# Patient Record
Sex: FEMALE | Race: WHITE | Employment: OTHER | URBAN - METROPOLITAN AREA
[De-identification: names, ages, dates, MRNs, and addresses within clinical notes are randomized per-mention and may not be internally consistent; named-entity substitution may affect disease eponyms.]

---

## 2024-04-18 ENCOUNTER — APPOINTMENT (OUTPATIENT)
Dept: RADIOLOGY | Facility: HOSPITAL | Age: 79
End: 2024-04-18
Payer: COMMERCIAL

## 2024-04-18 ENCOUNTER — HOSPITAL ENCOUNTER (OUTPATIENT)
Facility: HOSPITAL | Age: 79
Setting detail: OBSERVATION
Discharge: HOME | End: 2024-04-20
Attending: EMERGENCY MEDICINE | Admitting: STUDENT IN AN ORGANIZED HEALTH CARE EDUCATION/TRAINING PROGRAM
Payer: COMMERCIAL

## 2024-04-18 DIAGNOSIS — R29.90 STROKE-LIKE SYMPTOMS: Primary | ICD-10-CM

## 2024-04-18 DIAGNOSIS — I48.0 PAROXYSMAL ATRIAL FIBRILLATION (MULTI): ICD-10-CM

## 2024-04-18 DIAGNOSIS — I50.32 HEART FAILURE WITH IMPROVED EJECTION FRACTION (HFIMPEF) (MULTI): ICD-10-CM

## 2024-04-18 DIAGNOSIS — R56.9 FOCAL SEIZURE (MULTI): ICD-10-CM

## 2024-04-18 LAB
ALBUMIN SERPL BCP-MCNC: 4.1 G/DL (ref 3.4–5)
ALP SERPL-CCNC: 88 U/L (ref 33–136)
ALT SERPL W P-5'-P-CCNC: 19 U/L (ref 7–45)
ANION GAP SERPL CALC-SCNC: 23 MMOL/L (ref 10–20)
APTT PPP: 32 SECONDS (ref 27–38)
AST SERPL W P-5'-P-CCNC: 30 U/L (ref 9–39)
BASOPHILS # BLD AUTO: 0.06 X10*3/UL (ref 0–0.1)
BASOPHILS NFR BLD AUTO: 0.4 %
BILIRUB SERPL-MCNC: 0.7 MG/DL (ref 0–1.2)
BUN SERPL-MCNC: 20 MG/DL (ref 6–23)
CALCIUM SERPL-MCNC: 9.5 MG/DL (ref 8.6–10.3)
CARDIAC TROPONIN I PNL SERPL HS: 13 NG/L (ref 0–13)
CHLORIDE SERPL-SCNC: 102 MMOL/L (ref 98–107)
CO2 SERPL-SCNC: 18 MMOL/L (ref 21–32)
CREAT SERPL-MCNC: 1.1 MG/DL (ref 0.5–1.05)
EGFRCR SERPLBLD CKD-EPI 2021: 52 ML/MIN/1.73M*2
EOSINOPHIL # BLD AUTO: 0.04 X10*3/UL (ref 0–0.4)
EOSINOPHIL NFR BLD AUTO: 0.3 %
ERYTHROCYTE [DISTWIDTH] IN BLOOD BY AUTOMATED COUNT: 13.1 % (ref 11.5–14.5)
GLUCOSE SERPL-MCNC: 132 MG/DL (ref 74–99)
HCT VFR BLD AUTO: 47.1 % (ref 36–46)
HGB BLD-MCNC: 16.1 G/DL (ref 12–16)
HOLD SPECIMEN: NORMAL
IMM GRANULOCYTES # BLD AUTO: 0.09 X10*3/UL (ref 0–0.5)
IMM GRANULOCYTES NFR BLD AUTO: 0.6 % (ref 0–0.9)
INR PPP: 1 (ref 0.9–1.1)
LYMPHOCYTES # BLD AUTO: 2.6 X10*3/UL (ref 0.8–3)
LYMPHOCYTES NFR BLD AUTO: 18.6 %
MCH RBC QN AUTO: 31 PG (ref 26–34)
MCHC RBC AUTO-ENTMCNC: 34.2 G/DL (ref 32–36)
MCV RBC AUTO: 91 FL (ref 80–100)
MONOCYTES # BLD AUTO: 1.14 X10*3/UL (ref 0.05–0.8)
MONOCYTES NFR BLD AUTO: 8.2 %
NEUTROPHILS # BLD AUTO: 10.05 X10*3/UL (ref 1.6–5.5)
NEUTROPHILS NFR BLD AUTO: 71.9 %
NRBC BLD-RTO: 0 /100 WBCS (ref 0–0)
PLATELET # BLD AUTO: 194 X10*3/UL (ref 150–450)
POTASSIUM SERPL-SCNC: 4.3 MMOL/L (ref 3.5–5.3)
PROT SERPL-MCNC: 7.3 G/DL (ref 6.4–8.2)
PROTHROMBIN TIME: 11.3 SECONDS (ref 9.8–12.8)
RBC # BLD AUTO: 5.2 X10*6/UL (ref 4–5.2)
SODIUM SERPL-SCNC: 139 MMOL/L (ref 136–145)
WBC # BLD AUTO: 14 X10*3/UL (ref 4.4–11.3)

## 2024-04-18 PROCEDURE — 84484 ASSAY OF TROPONIN QUANT: CPT | Performed by: EMERGENCY MEDICINE

## 2024-04-18 PROCEDURE — 84075 ASSAY ALKALINE PHOSPHATASE: CPT | Performed by: EMERGENCY MEDICINE

## 2024-04-18 PROCEDURE — 70498 CT ANGIOGRAPHY NECK: CPT | Performed by: RADIOLOGY

## 2024-04-18 PROCEDURE — 70496 CT ANGIOGRAPHY HEAD: CPT | Performed by: RADIOLOGY

## 2024-04-18 PROCEDURE — 93005 ELECTROCARDIOGRAM TRACING: CPT

## 2024-04-18 PROCEDURE — 2550000001 HC RX 255 CONTRASTS: Performed by: EMERGENCY MEDICINE

## 2024-04-18 PROCEDURE — G0378 HOSPITAL OBSERVATION PER HR: HCPCS

## 2024-04-18 PROCEDURE — 85025 COMPLETE CBC W/AUTO DIFF WBC: CPT | Performed by: EMERGENCY MEDICINE

## 2024-04-18 PROCEDURE — 70450 CT HEAD/BRAIN W/O DYE: CPT | Mod: 59

## 2024-04-18 PROCEDURE — 99291 CRITICAL CARE FIRST HOUR: CPT | Mod: 25 | Performed by: EMERGENCY MEDICINE

## 2024-04-18 PROCEDURE — 2500000004 HC RX 250 GENERAL PHARMACY W/ HCPCS (ALT 636 FOR OP/ED): Performed by: EMERGENCY MEDICINE

## 2024-04-18 PROCEDURE — 70450 CT HEAD/BRAIN W/O DYE: CPT | Performed by: RADIOLOGY

## 2024-04-18 PROCEDURE — 70498 CT ANGIOGRAPHY NECK: CPT

## 2024-04-18 PROCEDURE — 36415 COLL VENOUS BLD VENIPUNCTURE: CPT | Performed by: EMERGENCY MEDICINE

## 2024-04-18 PROCEDURE — 96361 HYDRATE IV INFUSION ADD-ON: CPT

## 2024-04-18 PROCEDURE — 96375 TX/PRO/DX INJ NEW DRUG ADDON: CPT | Mod: 59

## 2024-04-18 PROCEDURE — 85730 THROMBOPLASTIN TIME PARTIAL: CPT | Performed by: EMERGENCY MEDICINE

## 2024-04-18 PROCEDURE — 85610 PROTHROMBIN TIME: CPT | Performed by: EMERGENCY MEDICINE

## 2024-04-18 PROCEDURE — 82465 ASSAY BLD/SERUM CHOLESTEROL: CPT | Mod: 91

## 2024-04-18 RX ORDER — LEVETIRACETAM 15 MG/ML
1500 INJECTION INTRAVASCULAR ONCE
Status: COMPLETED | OUTPATIENT
Start: 2024-04-18 | End: 2024-04-18

## 2024-04-18 RX ORDER — NADOLOL 20 MG/1
20 TABLET ORAL DAILY
COMMUNITY

## 2024-04-18 RX ORDER — SODIUM CHLORIDE, SODIUM LACTATE, POTASSIUM CHLORIDE, CALCIUM CHLORIDE 600; 310; 30; 20 MG/100ML; MG/100ML; MG/100ML; MG/100ML
100 INJECTION, SOLUTION INTRAVENOUS CONTINUOUS
Status: DISCONTINUED | OUTPATIENT
Start: 2024-04-19 | End: 2024-04-19

## 2024-04-18 RX ORDER — LORAZEPAM 2 MG/ML
1 INJECTION INTRAMUSCULAR ONCE
Status: COMPLETED | OUTPATIENT
Start: 2024-04-18 | End: 2024-04-18

## 2024-04-18 RX ORDER — TALC
3 POWDER (GRAM) TOPICAL NIGHTLY PRN
Status: DISCONTINUED | OUTPATIENT
Start: 2024-04-18 | End: 2024-04-20 | Stop reason: HOSPADM

## 2024-04-18 RX ORDER — ACETAMINOPHEN 160 MG/5ML
650 SOLUTION ORAL EVERY 4 HOURS PRN
Status: DISCONTINUED | OUTPATIENT
Start: 2024-04-18 | End: 2024-04-20 | Stop reason: HOSPADM

## 2024-04-18 RX ORDER — DILTIAZEM HYDROCHLORIDE 5 MG/ML
15 INJECTION INTRAVENOUS ONCE
Status: DISCONTINUED | OUTPATIENT
Start: 2024-04-18 | End: 2024-04-18

## 2024-04-18 RX ORDER — LEVETIRACETAM 500 MG/1
500 TABLET ORAL 2 TIMES DAILY
Status: DISCONTINUED | OUTPATIENT
Start: 2024-04-18 | End: 2024-04-18

## 2024-04-18 RX ORDER — ACETAMINOPHEN 650 MG/1
650 SUPPOSITORY RECTAL EVERY 4 HOURS PRN
Status: DISCONTINUED | OUTPATIENT
Start: 2024-04-18 | End: 2024-04-20 | Stop reason: HOSPADM

## 2024-04-18 RX ORDER — LEVETIRACETAM 500 MG/1
500 TABLET ORAL 2 TIMES DAILY
Status: DISCONTINUED | OUTPATIENT
Start: 2024-04-19 | End: 2024-04-20 | Stop reason: HOSPADM

## 2024-04-18 RX ORDER — NADOLOL 40 MG/1
20 TABLET ORAL DAILY
Status: DISCONTINUED | OUTPATIENT
Start: 2024-04-19 | End: 2024-04-20 | Stop reason: HOSPADM

## 2024-04-18 RX ORDER — POLYETHYLENE GLYCOL 3350 17 G/17G
17 POWDER, FOR SOLUTION ORAL DAILY
Status: DISCONTINUED | OUTPATIENT
Start: 2024-04-19 | End: 2024-04-20 | Stop reason: HOSPADM

## 2024-04-18 RX ORDER — ACETAMINOPHEN 325 MG/1
650 TABLET ORAL EVERY 4 HOURS PRN
Status: DISCONTINUED | OUTPATIENT
Start: 2024-04-18 | End: 2024-04-20 | Stop reason: HOSPADM

## 2024-04-18 RX ADMIN — IOHEXOL 75 ML: 350 INJECTION, SOLUTION INTRAVENOUS at 19:42

## 2024-04-18 RX ADMIN — LORAZEPAM 1 MG: 2 INJECTION, SOLUTION INTRAMUSCULAR; INTRAVENOUS at 19:25

## 2024-04-18 RX ADMIN — LORAZEPAM 1 MG: 2 INJECTION, SOLUTION INTRAMUSCULAR; INTRAVENOUS at 19:15

## 2024-04-18 RX ADMIN — LEVETIRACETAM 1500 MG: 15 INJECTION INTRAVENOUS at 19:49

## 2024-04-18 RX ADMIN — SODIUM CHLORIDE, POTASSIUM CHLORIDE, SODIUM LACTATE AND CALCIUM CHLORIDE 1000 ML: 600; 310; 30; 20 INJECTION, SOLUTION INTRAVENOUS at 20:17

## 2024-04-18 ASSESSMENT — LIFESTYLE VARIABLES
TOTAL SCORE: 0
EVER FELT BAD OR GUILTY ABOUT YOUR DRINKING: NO
HAVE YOU EVER FELT YOU SHOULD CUT DOWN ON YOUR DRINKING: NO
EVER HAD A DRINK FIRST THING IN THE MORNING TO STEADY YOUR NERVES TO GET RID OF A HANGOVER: NO
HAVE PEOPLE ANNOYED YOU BY CRITICIZING YOUR DRINKING: NO

## 2024-04-18 ASSESSMENT — PAIN SCALES - GENERAL
PAINLEVEL_OUTOF10: 0 - NO PAIN

## 2024-04-18 ASSESSMENT — COGNITIVE AND FUNCTIONAL STATUS - GENERAL
PERSONAL GROOMING: A LOT
MOBILITY SCORE: 9
CLIMB 3 TO 5 STEPS WITH RAILING: TOTAL
WALKING IN HOSPITAL ROOM: TOTAL
TURNING FROM BACK TO SIDE WHILE IN FLAT BAD: A LOT
DAILY ACTIVITIY SCORE: 12
DRESSING REGULAR UPPER BODY CLOTHING: A LOT
MOVING FROM LYING ON BACK TO SITTING ON SIDE OF FLAT BED WITH BEDRAILS: A LOT
DRESSING REGULAR LOWER BODY CLOTHING: A LOT
EATING MEALS: A LOT
MOVING TO AND FROM BED TO CHAIR: A LOT
STANDING UP FROM CHAIR USING ARMS: TOTAL
HELP NEEDED FOR BATHING: A LOT
TOILETING: A LOT

## 2024-04-18 ASSESSMENT — PAIN - FUNCTIONAL ASSESSMENT
PAIN_FUNCTIONAL_ASSESSMENT: 0-10
PAIN_FUNCTIONAL_ASSESSMENT: 0-10

## 2024-04-18 NOTE — ED PROVIDER NOTES
History/Exam limitations: none.   Additional history was obtained from patient and EMS personnel.    HPI:       Tamar Connelly is a 78 y.o. with a history of prior stroke, liver disease, history of DVT, who presents to the emergency department with change in mental status.  Patient was last seen normal at 8 AM today.  Her daughter left the house and went to work.  When she got home, she found her on the ground.  The patient had left-sided weakness, difficulty with gaze, and repeated tremors of the left side.  She cannot oppose gravity with left side.  She states that happened today but is unsure of what time it started.  In review of the records, patient does have prior history of stroke.  At one point, she was on anticoagulation.       Last Known Well Time: 0800        ------------------------------------------------------------------------------------------------------------------------------------------     Physical Exam:    ED Triage Vitals   Temp Pulse Resp BP   -- -- -- --      SpO2 Temp src Heart Rate Source Patient Position   -- -- -- --      BP Location FiO2 (%)     -- --          Gen: Not in acute distress  Head/Neck: NCAT  Eyes: Anicteric sclerae, noninjected conjunctivae  Nose: No rhinorrhea  Mouth:  MMM  Heart: Regular rate and rhythm w/ no murmurs, rubs, gallops  Lungs: CTAB w/o wheezes, rales, rhonchi, no increased work of breathing  Abdomen: Soft, NT/ND  Musculoskeletal: No deformities  Extremities: No edema.  Neurologic: Please see below for NIHSS  Skin: No rashes noted  Psychological: Calm        Interval: Baseline  Time: 9:03 PM  Person Administering Scale: Tee Krause MD     1a  Level of consciousness: 0=alert; keenly responsive   1b. LOC questions:  0=Performs both tasks correctly   1c. LOC commands: 0=Performs both tasks correctly   2.  Best Gaze: 1=partial gaze palsy   3. Visual: 0=No visual loss   4. Facial Palsy: 1=Minor paralysis (flattened nasolabial fold, asymmetric on smiling)   5a.  Motor left arm: 3=No effort against gravity, limb falls   5b.  Motor right arm: 0=No drift, limb holds 90 (or 45) degrees for full 10 seconds   6a. motor left leg: 3=No effort against gravity, limb falls   6b  Motor right le=No drift, limb holds 90 (or 45) degrees for full 10 seconds   7. Limb Ataxia: 1=Present in one limb   8.  Sensory: 1=Mild to moderate sensory loss; patient feels pinprick is less sharp or is dull on the affected side; there is a loss of superficial pain with pinprick but patient is aware She is being touched   9. Best Language:  0=No aphasia, normal   10. Dysarthria: 1=Mild to moderate, patient slurs at least some words and at worst, can be understood with some difficulty   11. Extinction and Inattention: 0=No abnormality     Total:   11         VAN: VAN: Positive     ------------------------------------------------------------------------------------------------------------------------------------------     Medical Decision Making:     ED Course as of 24   Thu  Mild leukocytosis 14,000.  Coags unremarkable. [MK]    Metabolic panel does show mild elevation in the anion gap.  I do feel this is consistent with the patient partial seizures.  Blood sugar is unremarkable. [MK]    CT head shows no acute intracranial hemorrhage.  There is large encephalomalacia in the right hemisphere consistent with prior stroke. [MK]    CTA shows no evidence of large vessel cutoff. [MK]      ED Course User Index  [MK] Tee Krause MD         Diagnoses as of 24   Stroke-like symptoms   Focal seizure (Multi)     MDM: Patient presents with left-sided weakness.  However, on arrival she is having focal seizure.  There is no secondary generalization but there is rhythmic motion of the left upper extremity.  Patient was given 1 mg of Ativan with some improvement but no abatement.  Second milligram was given and patient had resolution.  She does have some  improved motion of the upper extremity.  Stroke team had been activated and the patient was sent for CT and CTA.  She is not a TNK candidate as she is fully anticoagulated on Xarelto and last known well was approximate 11 hours prior.  Patient was also loaded with Keppra.  Patient has had no further seizure activity.  She does have A-fib with borderline RVR.  She was given fluids which seemed to improve it.  At this point, patient will be admitted for further observation and workup.  Family is agreeable with plan of care.    EKG interpreted by myself: Atrial fibrillation with rapid ventricular response.  Pre-existing right bundle branch block.  Rate of 156.  Mild lateral ST depression, rate based.  No STEMI.     Independent Interpretation of Studies: I independently interpreted the CT head and see No obvious evidence of intracranial hemorrhage    Chronic Medical Conditions Significantly Affecting Care: A-fib, prior stroke    Social Determinants of Health Significantly Affecting Care:  Does not follow-up consistently with healthcare     External Records Reviewed: I reviewed recent and relevant outside records including: Prior stroke, history of anticoagulant use and is on Xarelto     Discussion of Management with Other Providers:   I discussed the patient/results with: neurology and the admitting team      IV Thrombolysis IV Thrombolysis Checklist        IV Thrombolysis Given: No; Thrombolysis contraindication reason: Time from Last Known Well (or stroke onset) is >4.5 hours  and Patient receiving direct thrombin inhibitors or direct Factor Xa inhibitors          Procedure  Critical Care    Performed by: Tee Krause MD  Authorized by: Tee Krause MD    Critical care provider statement:     Critical care time (minutes):  35    Critical care time was exclusive of:  Separately billable procedures and treating other patients and teaching time    Critical care was necessary to treat or prevent imminent or  life-threatening deterioration of the following conditions:  CNS failure or compromise    Critical care was time spent personally by me on the following activities:  Ordering and performing treatments and interventions, ordering and review of laboratory studies, ordering and review of radiographic studies, re-evaluation of patient's condition, review of old charts, examination of patient, discussions with primary provider, discussions with consultants and evaluation of patient's response to treatment    Care discussed with: admitting provider             Tee Krause MD  04/18/24 5489

## 2024-04-18 NOTE — PROGRESS NOTES
PHARMACY STROKE RESPONSE      Patient Name: Tamar Connelly  MRN: [id number]  Location: [place] [address]    Patient Weight (kg):   Wt Readings from Last 1 Encounters:   04/18/24 76.5 kg (168 lb 10.4 oz)        An acute Brain Attack has been activated, pharmacy participated in multidisciplinary team bedside response for Tamar Connelly.  Contraindications for fibrinolytic therapy have been reviewed by pharmacy and any issues relating to medication therapy have been discussed directly with the provider(s) caring for this patient.     Pharmacy aided in the procurement, preparation, bedside response for antiepileptic medications(s). Patient did not receive fibrinolysis.    Orders Placed This Encounter      levETIRAcetam (Keppra) 1,500 mg in NaCl (iso-os) 100 mL      LORazepam (Ativan) injection 1 mg      LORazepam (Ativan) injection 1 mg      Thank you for allowing me to take part in the care of this patient.     Digna Carmen, PharmD  4/18/2024  7:55 PM         References:    Neu[facility] Stroke Tools   [facility] IV Thrombolysis Checklist

## 2024-04-19 ENCOUNTER — APPOINTMENT (OUTPATIENT)
Dept: CARDIOLOGY | Facility: HOSPITAL | Age: 79
End: 2024-04-19
Payer: COMMERCIAL

## 2024-04-19 ENCOUNTER — APPOINTMENT (OUTPATIENT)
Dept: NEUROLOGY | Facility: HOSPITAL | Age: 79
End: 2024-04-19
Payer: COMMERCIAL

## 2024-04-19 ENCOUNTER — APPOINTMENT (OUTPATIENT)
Dept: RADIOLOGY | Facility: HOSPITAL | Age: 79
End: 2024-04-19
Payer: COMMERCIAL

## 2024-04-19 LAB
ALBUMIN SERPL BCP-MCNC: 3.2 G/DL (ref 3.4–5)
ANION GAP SERPL CALC-SCNC: 11 MMOL/L (ref 10–20)
AORTIC VALVE MEAN GRADIENT: 2 MMHG
AORTIC VALVE PEAK VELOCITY: 0.81 M/S
AV PEAK GRADIENT: 2.6 MMHG
AVA (PEAK VEL): 4.18 CM2
AVA (VTI): 3.73 CM2
BUN SERPL-MCNC: 19 MG/DL (ref 6–23)
CALCIUM SERPL-MCNC: 8.9 MG/DL (ref 8.6–10.3)
CHLORIDE SERPL-SCNC: 104 MMOL/L (ref 98–107)
CHOLEST SERPL-MCNC: 240 MG/DL (ref 0–199)
CHOLESTEROL/HDL RATIO: 3.5
CO2 SERPL-SCNC: 27 MMOL/L (ref 21–32)
CREAT SERPL-MCNC: 0.95 MG/DL (ref 0.5–1.05)
EGFRCR SERPLBLD CKD-EPI 2021: 62 ML/MIN/1.73M*2
EJECTION FRACTION APICAL 4 CHAMBER: 50
ERYTHROCYTE [DISTWIDTH] IN BLOOD BY AUTOMATED COUNT: 13.2 % (ref 11.5–14.5)
EST. AVERAGE GLUCOSE BLD GHB EST-MCNC: 108 MG/DL
GLUCOSE SERPL-MCNC: 99 MG/DL (ref 74–99)
HBA1C MFR BLD: 5.4 %
HCT VFR BLD AUTO: 38.5 % (ref 36–46)
HDLC SERPL-MCNC: 69.4 MG/DL
HGB BLD-MCNC: 12.4 G/DL (ref 12–16)
LACTATE SERPL-SCNC: 1 MMOL/L (ref 0.4–2)
LEFT VENTRICLE INTERNAL DIMENSION DIASTOLE: 3.7 CM (ref 3.5–6)
LEFT VENTRICULAR OUTFLOW TRACT DIAMETER: 2 CM
LV EJECTION FRACTION BIPLANE: 49 %
MCH RBC QN AUTO: 30 PG (ref 26–34)
MCHC RBC AUTO-ENTMCNC: 32.2 G/DL (ref 32–36)
MCV RBC AUTO: 93 FL (ref 80–100)
NON-HDL CHOLESTEROL: 171 MG/DL (ref 0–149)
NRBC BLD-RTO: 0 /100 WBCS (ref 0–0)
PHOSPHATE SERPL-MCNC: 3.6 MG/DL (ref 2.5–4.9)
PLATELET # BLD AUTO: 115 X10*3/UL (ref 150–450)
POTASSIUM SERPL-SCNC: 4.3 MMOL/L (ref 3.5–5.3)
RBC # BLD AUTO: 4.14 X10*6/UL (ref 4–5.2)
RIGHT VENTRICLE FREE WALL PEAK S': 6.53 CM/S
RIGHT VENTRICLE PEAK SYSTOLIC PRESSURE: 25.3 MMHG
SODIUM SERPL-SCNC: 138 MMOL/L (ref 136–145)
TRICUSPID ANNULAR PLANE SYSTOLIC EXCURSION: 1.2 CM
WBC # BLD AUTO: 7.6 X10*3/UL (ref 4.4–11.3)

## 2024-04-19 PROCEDURE — G0378 HOSPITAL OBSERVATION PER HR: HCPCS

## 2024-04-19 PROCEDURE — 2500000004 HC RX 250 GENERAL PHARMACY W/ HCPCS (ALT 636 FOR OP/ED)

## 2024-04-19 PROCEDURE — 80069 RENAL FUNCTION PANEL: CPT

## 2024-04-19 PROCEDURE — 70551 MRI BRAIN STEM W/O DYE: CPT

## 2024-04-19 PROCEDURE — 85027 COMPLETE CBC AUTOMATED: CPT

## 2024-04-19 PROCEDURE — 2500000001 HC RX 250 WO HCPCS SELF ADMINISTERED DRUGS (ALT 637 FOR MEDICARE OP)

## 2024-04-19 PROCEDURE — 95819 EEG AWAKE AND ASLEEP: CPT | Performed by: PSYCHIATRY & NEUROLOGY

## 2024-04-19 PROCEDURE — 96361 HYDRATE IV INFUSION ADD-ON: CPT

## 2024-04-19 PROCEDURE — 97161 PT EVAL LOW COMPLEX 20 MIN: CPT | Mod: GP

## 2024-04-19 PROCEDURE — 70551 MRI BRAIN STEM W/O DYE: CPT | Performed by: RADIOLOGY

## 2024-04-19 PROCEDURE — 95819 EEG AWAKE AND ASLEEP: CPT

## 2024-04-19 PROCEDURE — 36415 COLL VENOUS BLD VENIPUNCTURE: CPT | Performed by: STUDENT IN AN ORGANIZED HEALTH CARE EDUCATION/TRAINING PROGRAM

## 2024-04-19 PROCEDURE — 2500000006 HC RX 250 W HCPCS SELF ADMINISTERED DRUGS (ALT 637 FOR ALL PAYERS): Mod: MUE

## 2024-04-19 PROCEDURE — 93306 TTE W/DOPPLER COMPLETE: CPT

## 2024-04-19 PROCEDURE — 83036 HEMOGLOBIN GLYCOSYLATED A1C: CPT

## 2024-04-19 PROCEDURE — 99223 1ST HOSP IP/OBS HIGH 75: CPT | Performed by: PSYCHIATRY & NEUROLOGY

## 2024-04-19 PROCEDURE — 93306 TTE W/DOPPLER COMPLETE: CPT | Performed by: INTERNAL MEDICINE

## 2024-04-19 PROCEDURE — 2500000006 HC RX 250 W HCPCS SELF ADMINISTERED DRUGS (ALT 637 FOR ALL PAYERS)

## 2024-04-19 PROCEDURE — 2500000004 HC RX 250 GENERAL PHARMACY W/ HCPCS (ALT 636 FOR OP/ED): Performed by: STUDENT IN AN ORGANIZED HEALTH CARE EDUCATION/TRAINING PROGRAM

## 2024-04-19 PROCEDURE — 83605 ASSAY OF LACTIC ACID: CPT | Performed by: STUDENT IN AN ORGANIZED HEALTH CARE EDUCATION/TRAINING PROGRAM

## 2024-04-19 PROCEDURE — 97165 OT EVAL LOW COMPLEX 30 MIN: CPT | Mod: GO

## 2024-04-19 RX ORDER — ATORVASTATIN CALCIUM 40 MG/1
40 TABLET, FILM COATED ORAL NIGHTLY
Status: DISCONTINUED | OUTPATIENT
Start: 2024-04-19 | End: 2024-04-20 | Stop reason: HOSPADM

## 2024-04-19 RX ADMIN — POLYETHYLENE GLYCOL 3350 17 G: 17 POWDER, FOR SOLUTION ORAL at 08:51

## 2024-04-19 RX ADMIN — SODIUM CHLORIDE, POTASSIUM CHLORIDE, SODIUM LACTATE AND CALCIUM CHLORIDE 100 ML/HR: 600; 310; 30; 20 INJECTION, SOLUTION INTRAVENOUS at 00:40

## 2024-04-19 RX ADMIN — NADOLOL 20 MG: 40 TABLET ORAL at 10:12

## 2024-04-19 RX ADMIN — LEVETIRACETAM 500 MG: 500 TABLET, FILM COATED ORAL at 08:50

## 2024-04-19 RX ADMIN — RIVAROXABAN 20 MG: 20 TABLET, FILM COATED ORAL at 17:19

## 2024-04-19 RX ADMIN — ATORVASTATIN CALCIUM 40 MG: 40 TABLET, FILM COATED ORAL at 20:32

## 2024-04-19 RX ADMIN — LEVETIRACETAM 500 MG: 500 TABLET, FILM COATED ORAL at 20:31

## 2024-04-19 SDOH — ECONOMIC STABILITY: INCOME INSECURITY: IN THE LAST 12 MONTHS, WAS THERE A TIME WHEN YOU WERE NOT ABLE TO PAY THE MORTGAGE OR RENT ON TIME?: NO

## 2024-04-19 SDOH — SOCIAL STABILITY: SOCIAL INSECURITY: ARE YOU OR HAVE YOU BEEN THREATENED OR ABUSED PHYSICALLY, EMOTIONALLY, OR SEXUALLY BY ANYONE?: NO

## 2024-04-19 SDOH — SOCIAL STABILITY: SOCIAL INSECURITY: HAVE YOU HAD ANY THOUGHTS OF HARMING ANYONE ELSE?: NO

## 2024-04-19 SDOH — SOCIAL STABILITY: SOCIAL INSECURITY: HAVE YOU HAD THOUGHTS OF HARMING ANYONE ELSE?: NO

## 2024-04-19 SDOH — ECONOMIC STABILITY: HOUSING INSECURITY: IN THE LAST 12 MONTHS, HOW MANY PLACES HAVE YOU LIVED?: 1

## 2024-04-19 SDOH — ECONOMIC STABILITY: INCOME INSECURITY: HOW HARD IS IT FOR YOU TO PAY FOR THE VERY BASICS LIKE FOOD, HOUSING, MEDICAL CARE, AND HEATING?: NOT VERY HARD

## 2024-04-19 SDOH — ECONOMIC STABILITY: TRANSPORTATION INSECURITY
IN THE PAST 12 MONTHS, HAS LACK OF TRANSPORTATION KEPT YOU FROM MEETINGS, WORK, OR FROM GETTING THINGS NEEDED FOR DAILY LIVING?: NO

## 2024-04-19 SDOH — ECONOMIC STABILITY: TRANSPORTATION INSECURITY
IN THE PAST 12 MONTHS, HAS THE LACK OF TRANSPORTATION KEPT YOU FROM MEDICAL APPOINTMENTS OR FROM GETTING MEDICATIONS?: NO

## 2024-04-19 SDOH — ECONOMIC STABILITY: HOUSING INSECURITY
IN THE LAST 12 MONTHS, WAS THERE A TIME WHEN YOU DID NOT HAVE A STEADY PLACE TO SLEEP OR SLEPT IN A SHELTER (INCLUDING NOW)?: NO

## 2024-04-19 SDOH — SOCIAL STABILITY: SOCIAL INSECURITY: ABUSE: ADULT

## 2024-04-19 SDOH — SOCIAL STABILITY: SOCIAL INSECURITY: WERE YOU ABLE TO COMPLETE ALL THE BEHAVIORAL HEALTH SCREENINGS?: YES

## 2024-04-19 SDOH — SOCIAL STABILITY: SOCIAL INSECURITY: HAS ANYONE EVER THREATENED TO HURT YOUR FAMILY OR YOUR PETS?: NO

## 2024-04-19 SDOH — SOCIAL STABILITY: SOCIAL INSECURITY: DOES ANYONE TRY TO KEEP YOU FROM HAVING/CONTACTING OTHER FRIENDS OR DOING THINGS OUTSIDE YOUR HOME?: NO

## 2024-04-19 SDOH — SOCIAL STABILITY: SOCIAL INSECURITY: DO YOU FEEL ANYONE HAS EXPLOITED OR TAKEN ADVANTAGE OF YOU FINANCIALLY OR OF YOUR PERSONAL PROPERTY?: NO

## 2024-04-19 SDOH — SOCIAL STABILITY: SOCIAL INSECURITY: DO YOU FEEL UNSAFE GOING BACK TO THE PLACE WHERE YOU ARE LIVING?: NO

## 2024-04-19 SDOH — SOCIAL STABILITY: SOCIAL INSECURITY: ARE THERE ANY APPARENT SIGNS OF INJURIES/BEHAVIORS THAT COULD BE RELATED TO ABUSE/NEGLECT?: NO

## 2024-04-19 ASSESSMENT — ACTIVITIES OF DAILY LIVING (ADL)
ADEQUATE_TO_COMPLETE_ADL: YES
LACK_OF_TRANSPORTATION: NO
LACK_OF_TRANSPORTATION: NO
DRESSING YOURSELF: NEEDS ASSISTANCE
WALKS IN HOME: NEEDS ASSISTANCE
GROOMING: NEEDS ASSISTANCE
HEARING - RIGHT EAR: FUNCTIONAL
FEEDING YOURSELF: NEEDS ASSISTANCE
TOILETING: NEEDS ASSISTANCE
PATIENT'S MEMORY ADEQUATE TO SAFELY COMPLETE DAILY ACTIVITIES?: YES
JUDGMENT_ADEQUATE_SAFELY_COMPLETE_DAILY_ACTIVITIES: YES
HEARING - LEFT EAR: FUNCTIONAL
ASSISTIVE_DEVICE: DENTURES UPPER;CANE
BATHING: NEEDS ASSISTANCE

## 2024-04-19 ASSESSMENT — LIFESTYLE VARIABLES
PRESCIPTION_ABUSE_PAST_12_MONTHS: NO
HOW MANY STANDARD DRINKS CONTAINING ALCOHOL DO YOU HAVE ON A TYPICAL DAY: PATIENT DOES NOT DRINK
SKIP TO QUESTIONS 9-10: 1
HOW OFTEN DO YOU HAVE A DRINK CONTAINING ALCOHOL: NEVER
SUBSTANCE_ABUSE_PAST_12_MONTHS: NO
AUDIT-C TOTAL SCORE: 0
HOW OFTEN DO YOU HAVE 6 OR MORE DRINKS ON ONE OCCASION: NEVER
AUDIT-C TOTAL SCORE: 0

## 2024-04-19 ASSESSMENT — COGNITIVE AND FUNCTIONAL STATUS - GENERAL
DRESSING REGULAR UPPER BODY CLOTHING: A LOT
TURNING FROM BACK TO SIDE WHILE IN FLAT BAD: A LOT
PATIENT BASELINE BEDBOUND: NO
STANDING UP FROM CHAIR USING ARMS: TOTAL
MOBILITY SCORE: 9
PERSONAL GROOMING: A LITTLE
MOBILITY SCORE: 11
WALKING IN HOSPITAL ROOM: A LOT
EATING MEALS: A LITTLE
DRESSING REGULAR LOWER BODY CLOTHING: A LOT
DAILY ACTIVITIY SCORE: 14
PERSONAL GROOMING: A LITTLE
MOVING FROM LYING ON BACK TO SITTING ON SIDE OF FLAT BED WITH BEDRAILS: A LITTLE
HELP NEEDED FOR BATHING: A LOT
DRESSING REGULAR UPPER BODY CLOTHING: A LOT
STANDING UP FROM CHAIR USING ARMS: TOTAL
MOVING TO AND FROM BED TO CHAIR: A LOT
TOILETING: A LOT
TOILETING: A LOT
DRESSING REGULAR LOWER BODY CLOTHING: A LOT
MOVING FROM LYING ON BACK TO SITTING ON SIDE OF FLAT BED WITH BEDRAILS: A LOT
HELP NEEDED FOR BATHING: A LOT
WALKING IN HOSPITAL ROOM: TOTAL
DAILY ACTIVITIY SCORE: 14
EATING MEALS: A LITTLE
CLIMB 3 TO 5 STEPS WITH RAILING: TOTAL
CLIMB 3 TO 5 STEPS WITH RAILING: TOTAL
TURNING FROM BACK TO SIDE WHILE IN FLAT BAD: A LOT
MOVING TO AND FROM BED TO CHAIR: A LOT

## 2024-04-19 ASSESSMENT — PAIN SCALES - GENERAL
PAINLEVEL_OUTOF10: 0 - NO PAIN
PAINLEVEL_OUTOF10: 0 - NO PAIN

## 2024-04-19 ASSESSMENT — PAIN - FUNCTIONAL ASSESSMENT: PAIN_FUNCTIONAL_ASSESSMENT: 0-10

## 2024-04-19 ASSESSMENT — PATIENT HEALTH QUESTIONNAIRE - PHQ9
SUM OF ALL RESPONSES TO PHQ9 QUESTIONS 1 & 2: 0
1. LITTLE INTEREST OR PLEASURE IN DOING THINGS: NOT AT ALL
2. FEELING DOWN, DEPRESSED OR HOPELESS: NOT AT ALL

## 2024-04-19 NOTE — H&P
History Of Present Illness  Tamar Connelly is a 78 y.o. female extensive past medical history including prior CVA, cirrhosis of the liver, history of multiple DVTs PE and paroxysmal A. fib, previous alcohol misuse, presented to hospital with sudden onset left upper extremity weakness, tremors, shaking.  She was last normal at 8 AM on 4/18/2024.  She was found on the floor of her house by her daughter shaking on the floor especially on her left side.  Daughter was at bedside as patient speaks Guamanian.  Patient was also was ANO x 1.  He was very difficult for the patient to understand what I was saying.  Daughter states that patient's mental state was completely normal yesterday for the seizure activity.  She did have episode of urinary incontinence during this episode.  She denies any tongue biting.  She denies any recent fever, chills, urinary symptoms, diarrhea, constipation, chest pain, shortness of breath, palpitations, or flulike symptoms.  She is a non-smoker.  She used to have excessive alcohol use but quit in 2018.  She lives at home with her daughter.    On arrival to ED, -A-fib RVR.  BP 99/57.  RR 21.  98% SpO2 on RA.  Afebrile.  ECG showed A-fib RVR.  Significant labs showed creatinine 1.10-at baseline.  Bicarbonate 18.  WCC 14.  Neutrophils 10.  Elevated anion gap.  CT brain head and neck angio was performed which showed significantly large area of encephalomalacia in the right parietal, subdural, and temporal lobe compatible with old infarct.  There is also segment stenosis of the P2 segment of the left posterior cerebral artery.  She had NIH score of 11 on arrival to the ED.  She was given 1 mg Ativan with some improvement in symptoms in the ED and then was loaded with IV Keppra.  Stroke team was activated however she was not a TNK candidate as she is fully anticoagulated with Xarelto and her last known well was approximately 11 hours prior to arrival to the ED.  ED also gave her some IV fluids which  "settled her A-fib rate however still in A-fib rhythm.  She is being admitted to medicine team for further management of new onset seizure.    CODE STATUS: DNR/DNI     Past Medical History  Past Medical History:   Diagnosis Date    Essential (primary) hypertension 12/28/2013    Hypertension       Surgical History  Past Surgical History:   Procedure Laterality Date    BREAST BIOPSY  03/12/2014    Biopsy Breast Percutaneous Needle Core    BREAST LUMPECTOMY  04/23/2014    Breast Surgery Lumpectomy    HERNIA REPAIR  03/12/2014    Hernia Repair    HYSTERECTOMY  03/12/2014    Hysterectomy    MR HEAD ANGIO WO IV CONTRAST  7/3/2019    MR HEAD ANGIO WO IV CONTRAST 7/3/2019 [facility]ICAL [facility]    MR NECK ANGIO WO IV CONTRAST  7/3/2019    MR NECK ANGIO WO IV CONTRAST 7/3/2019 [facility] CLINICAL LEGAC[facility]    TONSILLECTOMY  03/12/2014    Tonsillectomy        Social History  She has no history on file for tobacco use, alcohol use, and drug use.    Family History  No family history on file.     Allergies  Adhesive and House dust    Review of Systems   A 12 point review of systems was performed and otherwise negative except as stated in the HPI.   Physical Exam  General: Drowsy. No apparent distress.  HEENT:  Normocephalic, atraumatic, mucus membranes moist.  Partial gaze palsy present.  Neck:  Trachea midline.  No JVD.    Chest:  Clear to auscultation bilaterally. No wheezes, rales, or rhonchi.  CV: Regular rate.  Irregular rhythm..  Positive S1/S2.   Abdomen: Obese abdomen.  Slight tenderness to palpation diffusely.  Soft.  Extremities:  No lower extremity edema or cyanosis.   Neurological: ANO x 1.  No effort against gravity of left arm.  No effort against gravity of left leg.  Sensory loss in left arm and left side of face.  Mild dysarthria present.  Skin:  Warm and dry.   Last Recorded Vitals  Blood pressure 112/71, pulse 91, temperature 35.9 °C (96.6 °F), temperature source Oral, resp. rate 16, height 1.676 m (5' 6\"), weight 76.5 kg " (168 lb 10.4 oz), SpO2 98%.    Relevant Results  All labs and images were reviewed by myself.     Assessment/Plan   Tamar Connelly is a 78 y.o. female extensive past medical history including prior CVA, cirrhosis of the liver, history of multiple DVTs PE and paroxysmal A. fib, presented to hospital with sudden onset left upper extremity rhythmic jerking, tremors, weakness indicating a seizure.  CT head showed evidence of significant large area of encephalomalacia in the right parietal, subdural, temporal lobe compatible with old infarct and also segmental stenosis of the P2 segment of the left posterior cerebral artery.  She is being admitted to medicine for further management of new onset seizure.    # Focal seizure  # Acute CVA rule out  # Encephalomalacia  # Paroxysmal A-fib-on Xarelto  - This is a patient who is presenting with rhythmic jerking movements in the left upper extremity along with focal weakness and loss of sensation on her left upper extremity.  She also has loss of sensation on the left side of her face.  CT brain shows evidence of a large area of encephalomalacia in the right parietal, subdural, temporal lobe which is where an old infarct was located and there is also evidence of a some segmental stenosis of the P2 segment of left posterior cerebral artery.  Patient was also in A-fib RVR when she came in however rate settled after IV fluids.  She remains to have left-sided weakness especially in the upper extremity and the left part of her face.    Plan:  - For management of new onset seizure, we will continue with Keppra.  We will give Keppra 500 mg twice daily.  Will also ensure to check Keppra levels appropriately.  - We will obtain MRI brain to further investigate  - Will also obtain an EEG to further assess the electrical activity of neurons in the brain.  - we will start high dose statin  - We have consulted neurology team for further recommendations.  - Continue home nadolol 20 mg once daily.   Will also continue home Xarelto 20 mg once daily for anticoagulation.  - Patient passed bedside swallow test.  Continue adult regular diet.  - Patient will likely need acute rehab.    # Anxiety, depression  # Prior CVA  # Liver cirrhosis  # History of multiple DVTs/PE  - Patient is on Lexapro at home however dose is unknown.  Patient's daughter will obtain dose tomorrow morning.  We can continue tomorrow.    - DVT PPx: Xarelto  - pt/ot      Grace Nieto MD  PGY1 internal medicine

## 2024-04-19 NOTE — NURSING NOTE
04/19/24 1050 Patient Navigator  I introduced myself to the patient, her daughter Jina, and explained my role. Pt is attempting to rest and per RN, Elena, pt is A&O x 2. The patient lives [place] and she states the patient is not that active. I informed the patient of the recommendations of 30 minutes 3 times a week. I reviewed healthy food options and meal ideas. We reviewed the Stroke Folder, the handouts listed below and answered any questions. I reviewed the following lab values and what they indicate: cholesterol, HDL and A1C. I gave Jina my contact information and instructed her to call me as needed.  She denied any other needs or concern and appreciated my visit. Elena RUIZ, updated of my visit.    Handouts:   Stroke Folder  Lifestyle changes to prevent a stroke- American Stroke Association  How do I follow a healthy diet pattern? How can I improve my cholesterol? American Heart Association  What can I eat? Understanding your A1C test- American Diabetes Association  Planning healthy meals- Phoebe Nordisk  The connection between diabetes & stroke- American Stroke Association    Hue BLANCHARD, RN  Patient Navigator  Stroke Educator  Diabetes Care &

## 2024-04-19 NOTE — PROGRESS NOTES
24 1307   Discharge Planning   Living Arrangements Children   Support Systems Children   Assistance Needed Bathing   Type of Residence Private residence   Number of Stairs to Enter Residence 4   Number of Stairs Within Residence 0   Do you have animals or pets at home? No   Home or Post Acute Services In home services   Type of Home Care Services Home OT;Home PT   Patient expects to be discharged to: Home   Financial Resource Strain   How hard is it for you to pay for the very basics like food, housing, medical care, and heating? Not very   Housing Stability   In the last 12 months, was there a time when you were not able to pay the mortgage or rent on time? N   In the last 12 months, how many places have you lived? 1   In the last 12 months, was there a time when you did not have a steady place to sleep or slept in a shelter (including now)? N   Transportation Needs   In the past 12 months, has lack of transportation kept you from medical appointments or from getting medications? no   In the past 12 months, has lack of transportation kept you from meetings, work, or from getting things needed for daily living? No   Patient Choice   Provider Choice list and CMS website (https://medicare.gov/care-compare#search) for post-acute Quality and Resource Measure Data were provided and reviewed with: Family     Met with patient and daughter at bedside. Introduced self and role as care coordinator. Name, , Address, and insurance verified. Patient emergency contact is Di Valdez [phone]. Daughter requesting list of home health agencies. List provided, awaiting choice. Care coordinators will follow for discharge planning.

## 2024-04-19 NOTE — PROGRESS NOTES
Occupational Therapy    Evaluation    Patient Name: Tamar Connelly  MRN: [id number]  Today's Date: 4/19/2024  Time Calculation  Start Time: 1003  Stop Time: 1023  Time Calculation (min): 20 min        Assessment:  End of Session Communication: Bedside nurse  End of Session Patient Position: Bed, 2 rail up, Alarm on (hob elevated, call light in reach, all needs met)  OT Assessment Results: Decreased ADL status, Decreased upper extremity range of motion, Decreased upper extremity strength, Decreased safe judgment during ADL, Decreased cognition, Decreased endurance, Decreased fine motor control, Decreased functional mobility, Decreased gross motor control  Plan:  Treatment Interventions: ADL retraining, Functional transfer training, UE strengthening/ROM, Endurance training, Equipment evaluation/education, Patient/family training, Neuromuscular reeducation, Compensatory technique education  OT Frequency: 3 times per week  OT Discharge Recommendations: High intensity level of continued care  OT - OK to Discharge: Yes (once medically appropriate to next level of care)  Treatment Interventions: ADL retraining, Functional transfer training, UE strengthening/ROM, Endurance training, Equipment evaluation/education, Patient/family training, Neuromuscular reeducation, Compensatory technique education    Subjective   Current Problem:  1. Stroke-like symptoms  Transthoracic Echo (TTE) Complete    Transthoracic Echo (TTE) Complete      2. Focal seizure (Multi)  Transthoracic Echo (TTE) Complete    Transthoracic Echo (TTE) Complete      3. Heart failure with improved ejection fraction (HFimpEF) (Multi)  Transthoracic Echo (TTE) Complete    Transthoracic Echo (TTE) Complete        General:  General  Reason for Referral: OT eval and tx; ADLs. dx; # Focal seizure  # Acute CVA rule out  # Encephalomalacia  # Paroxysmal A-fib-on Xarelto  Referred By: Goyo  Past Medical History Relevant to Rehab: 78 y.o. female extensive past medical history  including prior CVA, cirrhosis of the liver, history of multiple DVTs PE and paroxysmal A. fib, previous alcohol misuse, presented to hospital with sudden onset left upper extremity weakness, tremors, shaking.  She was last normal at 8 AM on 4/18/2024.  She was found on the floor of her house by her daughter shaking on the floor especially on her left side.  Daughter was at bedside as patient speaks [place].  Patient was also was ANO x 1.  He was very difficult for the patient to understand what I was saying.  Daughter states that patient's mental state was completely normal yesterday for the seizure activity.  She did have episode of urinary incontinence during this episode.  She denies any tongue biting.  She denies any recent fever, chills, urinary symptoms, diarrhea, constipation, chest pain, shortness of breath, palpitations, or flulike symptoms.  She is a non-smoker.  She used to have excessive alcohol use but quit in 2018.  She lives at home with her daughter.  Family/Caregiver Present:  (daughter present, very supportive. assists with translating at times as patient primary [place] speaking, however understands simple English. daughter also assisting with providing PLOF info due to patient fatigue/lethargy initially)  Co-Treatment: PT  Co-Treatment Reason: for safety and to maximize therapeutic performance  Patient Position Received:  (patient lying in bed at start and end of eval, 2 rails up, call light in reach, bed alarm engaged, all needs met. tele in place throughout)  General Comment: CT brain: negative acute,    Large right hemispheric area of encephalomalacia has developed in the  interval. This suggests old infarct  . CTA head and neck:   Segment of stenosis of the P2 segment of left posterior cerebral  artery. The left posterior cerebral artery is however patent  distally. The right posterior cerebral artery is grossly patent.      Mild atherosclerotic disease of the bilateral carotid bulbs and   "proximal internal carotid arteries without evidence of  hemodynamically significant stenosis. MRI brain: Encephalomalacia right hemisphere consistent with previous infarction      There is no evidence of acute hemorrhage, mass lesion or acute  infarction  Precautions:  Medical Precautions:  (fall, alarm, language-primary lon is Y[place] but able to communicat w/ English)       Pain:  Pain Assessment  Pain Assessment:  (denies pain)    Objective   Cognition:  Overall Cognitive Status:  (oriented to person and place (reports \"hospital\" not sure which one), disoriented to time which daughter reports is baseline)  Insight: Severe  Impulsive: Severely           Home Living:  Type of Home:  (per patient daughter report upon eval: patient lives with daughter. house with 3 WAYNE from front and 1 WAYNE from garage with 3 additional steps to kitchen. no hanrails, requires assist from daughter to enter. patient has first floor set up.)  Bathroom Shower/Tub:  (tub shower with tub transfer bench)  Bathroom Toilet:  (high rise toilet; also owns BSC)  Home Living Comments: daughter works full time but reports she can take FMLA  Prior Function:  Level of [place]:  (independent in ADLs/toileting and assist for showers. daughter does IADLs. patient sleeps on couch. no AD use PLOF. denies falls)       ADL:  ADL Comments: anticipate grossly Mod-MAX A for ADLs based on transfers adn mobility and left side weakness       Bed Mobility/Transfers: Bed Mobility  Bed Mobility:  (completed supine <-->sit with MOD A x 1)    Transfers  Transfer:  (completed STS with MIN A x 1 with WW)      Functional Mobility:  Functional Mobility  Functional Mobility Performed:  (engaged in simple mobility/side steps with MOD A x 1 with WW)     Sensation:  Sensation Comment: denies numbness/tingling  Strength:  Strength Comments: RUE ROM/MMT grossly WFL. LUE ROM/MMT grossly 3-/5       Coordination:  Movements are Fluid and Coordinated: No  Finger to " Nose: Impaired (overshooting/undershooting with LUE)   Hand Function:  Gross Grasp: Impaired  Coordination: Impaired      Outcome Measures:[facility] Daily Activity  Putting on and taking off regular lower body clothing: A lot  Bathing (including washing, rinsing, drying): A lot  Putting on and taking off regular upper body clothing: A lot  Toileting, which includes using toilet, bedpan or urinal: A lot  Taking care of personal grooming such as brushing teeth: A little  Eating Meals: A little  Daily Activity - Total Score: 14        Education Documentation  Body Mechanics, taught by Marily Gardner OT at 4/19/2024  1:28 PM.  Learner: Patient  Readiness: Acceptance  Method: Explanation  Response: Verbalizes Understanding, Needs Reinforcement    Education Comments  No comments found.        OP EDUCATION:       Goals:  Encounter Problems       Encounter Problems (Active)       OT Goals       STG- patient will complete LB dressing with CGA with use of ae/ad/dme prn (Progressing)       Start:  04/19/24    Expected End:  05/03/24            STG- patient will complete toileting with CGA with use of ae/ad/dme prn (Progressing)       Start:  04/19/24    Expected End:  05/03/24            STG- patient will complete transfers to/from bed, chair, commode at CGA with use of ae/ad/dme prn (Progressing)       Start:  04/19/24    Expected End:  05/03/24            STG- patient will complete simple mobility with CGA with use of ae/ad/dme prn (Progressing)       Start:  04/19/24    Expected End:  05/03/24            STG- patient will complete grooming tasks with CGA with use of ae/ad/dme prn (Progressing)       Start:  04/19/24    Expected End:  05/03/24

## 2024-04-19 NOTE — PROGRESS NOTES
Tamar Connelly is a 78 y.o. female on day 0 of admission presenting with Stroke-like symptoms.      Assessment / Plan        Likely seizure from prior stroke  Rule out CVA (unlikely stroke at this time)  Encephalomalacia  Paroxysmal A-fib on Xarelto  Plan:  -MRI did not reveal any signs of stroke  -EEG, revealed structural right sided lesion  -Pending echo  -A1c 5.4  -Neurology following recommend continuation of statin and Keppra  -NIH down to 5 from 11 yesterday    Chronic Medical conditions  Anxiety  Depression  There is cirrhosis  History of multiple DVT/PE  Plan:  -Continue home Xarelto and MiraLAX    DVT ppx: Xarelto  IVF: None  Diet: Regular   Consults: Neuro    Care Planning/PT-OT: PT/OT consulted       Rafita Castillo MD   PGY-1, Internal Medicine  This is a preliminary note, please await attending attestation for final A/P    Subjective     Patient seen and examined. No acute overnight events.  Patient feeling significantly improved today.  Daughter was present and states her mother looks a lot better today.  Patient has significantly increased in strength and sensation on the left side.  Denies any chest pain, shortness of breath, diaphoresis, or any other new/acute symptoms at this time      Objective       Physical Exam:  General: Pleasant and awake. No apparent distress.  HEENT:  Normocephalic, atraumatic, mucus membranes moist.    Neck:  Trachea midline.  No JVD.    Chest:  Clear to auscultation bilaterally. No wheezes, rales, or rhonchi.  CV: Regular rate.  Irregular rhythm..  Positive S1/S2.   Abdomen: Obese abdomen.  Slight tenderness to palpation diffusely.  Soft.  Extremities:  No lower extremity edema or cyanosis.   Neurological: ANO x 2.  Cranial nerves intact, visual fields full, extraocular motions are full with no nystagmus, motor strength is 5 out of 5 in right and upper and 4 out of 5 and left upper and lower.  Sensation is intact throughout  Skin:  Warm and dry.     Last Recorded Vitals  Blood  "pressure (!) 141/99, pulse 77, temperature 35.7 °C (96.3 °F), temperature source Temporal, resp. rate 20, height 1.676 m (5' 6\"), weight 76.5 kg (168 lb 10.4 oz), SpO2 98%.  Intake/Output last 3 Shifts:  I/O last 3 completed shifts:  In: 1550 (20.3 mL/kg) [I.V.:450 (5.9 mL/kg); IV Piggyback:1100]  Out: 100 (1.3 mL/kg) [Urine:100 (0 mL/kg/hr)]  Weight: 76.5 kg     Last CBC & BMP  Lab Results   Component Value Date    GLUCOSE 99 04/19/2024    CALCIUM 8.9 04/19/2024     04/19/2024    K 4.3 04/19/2024    CO2 27 04/19/2024     04/19/2024    BUN 19 04/19/2024    CREATININE 0.95 04/19/2024     Lab Results   Component Value Date    WBC 7.6 04/19/2024    HGB 12.4 04/19/2024    HCT 38.5 04/19/2024    MCV 93 04/19/2024     (L) 04/19/2024           "

## 2024-04-19 NOTE — PROGRESS NOTES
Physical Therapy    Physical Therapy Evaluation    Patient Name: Tamar Connelly  MRN: [id number]  Today's Date: 4/19/2024   Time Calculation  Start Time: 1003  Stop Time: 1023  Time Calculation (min): 20 min    Assessment/Plan   PT Assessment  PT Assessment Results: Decreased strength, Decreased endurance, Impaired balance, Decreased mobility, Decreased safety awareness  Rehab Prognosis: Good  Evaluation/Treatment Tolerance: Patient limited by fatigue  End of Session Communication: Bedside nurse  Assessment Comment: Continued skilled PT intervention indicated to facilitate increased strength, balance & gait stability  End of Session Patient Position: Bed, 2 rail up, Alarm on (hob elevated, call light in reach, all needs met)  IP OR SWING BED PT PLAN  Inpatient or Swing Bed: Inpatient  PT Plan  Treatment/Interventions: Bed mobility, Transfer training, Gait training, Stair training, Balance training, Therapeutic exercise, Therapeutic activity  PT Plan: Skilled PT  PT Frequency: 5 times per week  PT Discharge Recommendations: High intensity level of continued care  PT - OK to Discharge: Yes (to next level of care when cleared by medical team)    Subjective     Current Problem:  Patient Active Problem List   Diagnosis    Stroke-like symptoms       General Visit Information:  General  Reason for Referral: PT eval & treat/impaired mobility; DX: focal seizure, cva r/o, encephalomalacia  Referred By: Goyo  Past Medical History Relevant to Rehab: 78 y.o. female extensive past medical history including prior CVA, cirrhosis of the liver, history of multiple DVTs PE and paroxysmal A. fib, previous alcohol misuse, presented to hospital with sudden onset left upper extremity weakness, tremors, shaking.  She was last normal at 8 AM on 4/18/2024.  She was found on the floor of her house by her daughter shaking on the floor especially on her left side. (CT head showed evidence of significant large area of encephalomalacia in the right  "parietal, subdural, temporal lobe compatible with old infarct and also segmental stenosis of the P2 segment of the left posterior cerebral artery)  Family/Caregiver Present:  (daughter present,supportive, translates for pt as needed, provides prior level of function & environment as pt lethargic initially upon entering room)  Caregiver Feedback: Per conference w/ RN patient stable to participate in therapy  Co-Treatment: OT  Co-Treatment Reason: to maximize pt safety & mobility  General Comment: Pleasant & cooperative, receptive to mobility& instructions, impulsive w/ mobility, decreased insight regarding deficits; functional mobility limited by lt sided weakenss, decreased motor control/placement lue/lle's; initially lethargic w/ increased arousal w/ mobility    Home Living:  Home Living  Home Living Comments: Lives w/ dtr who works FT but can take time off to support pt as needed; 1step w/o rail +3steps w/ rail to enter 1st fl set up; tub shower w/ transfer bench, raised toilet near singk, sleeps on couch    Prior Level of Function:  Prior Function Per Pt/Caregiver Report  Prior Function Comments: independent gait w/o use of device, no h/o falls; assist showering but otherwise independent; daugther manages iadl/driving/shopping    Precautions:  Precautions  Precautions Comment: fall, alarm, language-primary lon is [place] but able to communicat w/ English  Pain:  Pain Assessment  Pain Assessment: 0-10  Pain Score: 0 - No pain    Cognition:  Cognition  Overall Cognitive Status:  (oriented to person & place \"hospital\" but not which one; disoriented to date which dtr reports is baseline)    General Assessments:    Sensation  Sensation Comment: denies numbness/tingling        Functional Assessments:     Bed Mobility  Bed Mobility:  (mod assist x1 supine<>sit)  Transfers  Transfer:  (min assist x1 sit<>stand bed<>ww cues & assist for safe lue hand plcmt)  Ambulation/Gait Training  Ambulation/Gait Training " Performed:  (mod assist x1 w/ ww 2ft forward/2ft backward, 3ft sidestepping rt toward hob, assist to manage ww, cues for advancing le's w/ ataxic like mvmt lle)   Extremity/Trunk Assessments:   RLE   RLE :  (arom wfl, strength: grossly 4/5)  LLE   LLE :  (arom wfl, strength: hip flexion 2+/5, knee ext 3+/5 ankle df 3+/5)    Outcome Measures:  [facility] Basic Mobility  Turning from your back to your side while in a flat bed without using bedrails: A little  Moving from lying on your back to sitting on the side of a flat bed without using bedrails: A lot  Moving to and from bed to chair (including a wheelchair): A lot  Standing up from a chair using your arms (e.g. wheelchair or bedside chair): Total  To walk in hospital room: A lot  Climbing 3-5 steps with railing: Total  Basic Mobility - Total Score: 11   Goals:  Encounter Problems       Encounter Problems (Active)       PT Problem       STG - Pt will transition supine <> sitting with sba  (Progressing)       Start:  04/19/24    Expected End:  05/03/24            STG - Pt will transfer STS with cga using appropriate assistive device  (Progressing)       Start:  04/19/24    Expected End:  05/03/24            STG - Pt will amb >=40ftx2' using appropriate assistive device for gait stability  with cga  (Progressing)       Start:  04/19/24    Expected End:  05/03/24            STG -  Pt will navigate >=4 stairs simulating home environment using  with min assist x1  (Progressing)       Start:  04/19/24    Expected End:  05/03/24               Pain - Adult            Education Documentation  Mobility Training, taught by Adrienne Hein PT at 4/19/2024 12:06 PM.  Learner: Significant Other, Patient  Readiness: Acceptance  Method: Explanation  Response: Verbalizes Understanding, Needs Reinforcement  Comment: safety, activity progression

## 2024-04-19 NOTE — CONSULTS
Inpatient consult to Neurology  Consult performed by: Juanis Clement MD  Consult ordered by: Tee Krause MD      History Of Present Illness  Tamar Connelly is a 78 y.o. female presenting with past medical history including prior CVA, cirrhosis , DVTs ,PE , Afib, anxiety, previous alcohol misuse, admitted for sudden onset left upper extremity shaking.   She was last normal at 8 AM on 4/18/2024.  She was found on the floor of her house by her daughter shaking on the floor especially on her left side in mid afternoon on 4/18.  There was report of urinary incontinence during her episode.  No prior seizures.  She denies any recent fever, chills, urinary symptoms, diarrhea, constipation, chest pain, shortness of breath, palpitations, or flulike symptoms.  She is a non-smoker.  She used to have excessive alcohol use but quit in 2018.    States that she takes Ativan for anxiety.  She gets easily restless.  SH: She lives at home with her daughter.    In the ER she was in rapid A-fib.  Her white cells were 14.  She had a CT head and CT angio head and neck that showed large area of encephalomalacia in the right parietal and temporal lobe consistent with her old stroke.  In the ER she received 1 mg Ativan patient is on Xarelto at home.  She was not a TNK candidate.    Past Medical History  Past Medical History:   Diagnosis Date    Essential (primary) hypertension 12/28/2013    Hypertension     Surgical History  Past Surgical History:   Procedure Laterality Date    BREAST BIOPSY  03/12/2014    Biopsy Breast Percutaneous Needle Core    BREAST LUMPECTOMY  04/23/2014    Breast Surgery Lumpectomy    HERNIA REPAIR  03/12/2014    Hernia Repair    HYSTERECTOMY  03/12/2014    Hysterectomy    MR HEAD ANGIO WO IV CONTRAST  7/3/2019    MR HEAD ANGIO WO IV CONTRAST 7/3/2019 [facility]ICAL [facility]    MR NECK ANGIO WO IV CONTRAST  7/3/2019    MR NECK ANGIO WO IV CONTRAST 7/3/2019 [facility] CL[facility]ICAL [facility]GAC[facility]    TONSILLECTOMY  03/12/2014     "Tonsillectomy     Social History  Social History     Tobacco Use    Smoking status: Never     Passive exposure: Never    Smokeless tobacco: Never     Allergies  Adhesive and House dust  Medications Prior to Admission   Medication Sig Dispense Refill Last Dose    nadolol (Corgard) 20 mg tablet Take 1 tablet (20 mg) by mouth once daily.   4/18/2024       Review of Systems  Neurological Exam  General Exam:    Appearance:  no acute distress, cooperative.  HEENT: normocephalic /atraumatic.  Cardiovascular/Lungs/Abdomen: No carotid bruits to auscultation bilaterally, heart is regular in rate and rhythm.  Extremities/Skin: no peripheral edema.    NEUROLOGICAL EXAMINATION:    Mental status:  alert and oriented to person, place and month.  Fluent.     Cranial nerves:    II/III: Fundoscopic examination was technically difficult. Visual fields are full. Pupils are 2 mm and reactive bilaterally.  III/IV/VI: Extraocular movements are full with no nystagmus.   V: Facial sensation is intact to light touch.  VII: Face is symmetric.  VIII: hearing is impaired bilaterally.  IX/X: Palate elevates symmetrically to phonation.  XI: Sternocleidomastoid is MRC 5/5 to strength testing.  XII: Tongue is midline.    Motor exam: Strength is MRC 5/5 throughout R, LUE/LLE 4/5     Sensory exam: Sensation is intact to light touch throughout.    Reflexes: Reflexes are 2+ and symmetric. Bilateral plantar responses are flexor.    Coordination: intact on R. Impaired on L        Physical Exam  Last Recorded Vitals  Blood pressure (!) 141/99, pulse 77, temperature 35.7 °C (96.3 °F), temperature source Temporal, resp. rate 20, height 1.676 m (5' 6\"), weight 76.5 kg (168 lb 10.4 oz), SpO2 98%.    Relevant Results  Scheduled medications  atorvastatin, 40 mg, oral, Nightly  levETIRAcetam, 500 mg, oral, BID  nadolol, 20 mg, oral, Daily  polyethylene glycol, 17 g, oral, Daily  rivaroxaban, 20 mg, oral, Daily with evening meal      Continuous medications   "   PRN medications  PRN medications: acetaminophen **OR** acetaminophen **OR** acetaminophen, melatonin  Results for orders placed or performed during the hospital encounter of 04/18/24 (from the past 24 hour(s))   SST TOP   Result Value Ref Range    Extra Tube Hold for add-ons.    CBC and Auto Differential   Result Value Ref Range    WBC 14.0 (H) 4.4 - 11.3 x10*3/uL    nRBC 0.0 0.0 - 0.0 /100 WBCs    RBC 5.20 4.00 - 5.20 x10*6/uL    Hemoglobin 16.1 (H) 12.0 - 16.0 g/dL    Hematocrit 47.1 (H) 36.0 - 46.0 %    MCV 91 80 - 100 fL    MCH 31.0 26.0 - 34.0 pg    MCHC 34.2 32.0 - 36.0 g/dL    RDW 13.1 11.5 - 14.5 %    Platelets 194 150 - 450 x10*3/uL    Neutrophils % 71.9 40.0 - 80.0 %    Immature Granulocytes %, Automated 0.6 0.0 - 0.9 %    Lymphocytes % 18.6 13.0 - 44.0 %    Monocytes % 8.2 2.0 - 10.0 %    Eosinophils % 0.3 0.0 - 6.0 %    Basophils % 0.4 0.0 - 2.0 %    Neutrophils Absolute 10.05 (H) 1.60 - 5.50 x10*3/uL    Immature Granulocytes Absolute, Automated 0.09 0.00 - 0.50 x10*3/uL    Lymphocytes Absolute 2.60 0.80 - 3.00 x10*3/uL    Monocytes Absolute 1.14 (H) 0.05 - 0.80 x10*3/uL    Eosinophils Absolute 0.04 0.00 - 0.40 x10*3/uL    Basophils Absolute 0.06 0.00 - 0.10 x10*3/uL   Comprehensive metabolic panel   Result Value Ref Range    Glucose 132 (H) 74 - 99 mg/dL    Sodium 139 136 - 145 mmol/L    Potassium 4.3 3.5 - 5.3 mmol/L    Chloride 102 98 - 107 mmol/L    Bicarbonate 18 (L) 21 - 32 mmol/L    Anion Gap 23 (H) 10 - 20 mmol/L    Urea Nitrogen 20 6 - 23 mg/dL    Creatinine 1.10 (H) 0.50 - 1.05 mg/dL    eGFR 52 (L) >60 mL/min/1.73m*2    Calcium 9.5 8.6 - 10.3 mg/dL    Albumin 4.1 3.4 - 5.0 g/dL    Alkaline Phosphatase 88 33 - 136 U/L    Total Protein 7.3 6.4 - 8.2 g/dL    AST 30 9 - 39 U/L    Bilirubin, Total 0.7 0.0 - 1.2 mg/dL    ALT 19 7 - 45 U/L   Troponin I, High Sensitivity   Result Value Ref Range    Troponin I, High Sensitivity 13 0 - 13 ng/L   Protime-INR   Result Value Ref Range    Protime 11.3 9.8  - 12.8 seconds    INR 1.0 0.9 - 1.1   APTT   Result Value Ref Range    aPTT 32 27 - 38 seconds   Lipid Panel Non-Fasting   Result Value Ref Range    Cholesterol 240 (H) 0 - 199 mg/dL    HDL-Cholesterol 69.4 mg/dL    Cholesterol/HDL Ratio 3.5     Non-HDL Cholesterol 171 (H) 0 - 149 mg/dL   CBC   Result Value Ref Range    WBC 7.6 4.4 - 11.3 x10*3/uL    nRBC 0.0 0.0 - 0.0 /100 WBCs    RBC 4.14 4.00 - 5.20 x10*6/uL    Hemoglobin 12.4 12.0 - 16.0 g/dL    Hematocrit 38.5 36.0 - 46.0 %    MCV 93 80 - 100 fL    MCH 30.0 26.0 - 34.0 pg    MCHC 32.2 32.0 - 36.0 g/dL    RDW 13.2 11.5 - 14.5 %    Platelets 115 (L) 150 - 450 x10*3/uL   Renal Function Panel   Result Value Ref Range    Glucose 99 74 - 99 mg/dL    Sodium 138 136 - 145 mmol/L    Potassium 4.3 3.5 - 5.3 mmol/L    Chloride 104 98 - 107 mmol/L    Bicarbonate 27 21 - 32 mmol/L    Anion Gap 11 10 - 20 mmol/L    Urea Nitrogen 19 6 - 23 mg/dL    Creatinine 0.95 0.50 - 1.05 mg/dL    eGFR 62 >60 mL/min/1.73m*2    Calcium 8.9 8.6 - 10.3 mg/dL    Phosphorus 3.6 2.5 - 4.9 mg/dL    Albumin 3.2 (L) 3.4 - 5.0 g/dL   Lactate   Result Value Ref Range    Lactate 1.0 0.4 - 2.0 mmol/L   Hemoglobin A1c   Result Value Ref Range    Hemoglobin A1C 5.4 see below %    Estimated Average Glucose 108 Not Established mg/dL     MR brain wo IV contrast    Result Date: 4/19/2024  Interpreted By:  Jalen Peterson  and Edgardo Fowler STUDY: MR BRAIN WO IV CONTRAST;  4/19/2024 8:35 am   INDICATION: Signs/Symptoms:new seizure. Per EMR: History of prior CVAs. On 04/18/2024 she was found on the floor of her house by her daughter shaking on the floor especially on her left side. Had an episode of incontinence. And was A&Ox1 at that time.   COMPARISON: CT head 04/18/2024 MRI brain 07/03/2019   ACCESSION NUMBER(S): [id number]   ORDERING CLINICIAN: DAPHNE NAVARRO   TECHNIQUE: Axial T2, FLAIR, DWI, gradient echo T2 and sagittal and coronal T1 weighted images of the brain were acquired. Examination is motion  limited   FINDINGS: Limited evaluation coronal view due to artifact.   CSF Spaces: There is prominence of ventricles and sulci compatible with diffuse parenchymal volume loss. No abnormal extra-axial collection.   Parenchyma: Large right hemispheric area of encephalomalacia has developed in the interim when compared to prior MRI from 07/03/2019. There is no restricted diffusion to suggest acute infarction. Minimal nonspecific T2 hyperintense foci in the white matter, likely secondary to chronic small-vessel ischemic disease. No evidence of intracranial hemorrhage. There is no mass effect or midline shift.   Paranasal Sinuses and Mastoids: Unremarkable.       Encephalomalacia right hemisphere consistent with previous infarction   There is no evidence of acute hemorrhage, mass lesion or acute infarction.   I personally reviewed the images/study and I agree with the findings as stated by Janeth Reynoso DO, PGY-2. This study was interpreted at [facility], [place], [place].   MACRO: None   Signed by: Jalen Peterson 4/19/2024 9:50 AM Dictation workstation:   [id number]    CT brain attack angio head and neck W and WO IV contrast    Result Date: 4/18/2024  Interpreted By:  Harshad Leon, STUDY: CT BRAIN ATTACK ANGIO HEAD AND NECK W AND WO IV CONTRAST;  4/18/2024 7:42 pm   INDICATION: Signs/Symptoms:VAN positive, left weakness.   COMPARISON: CT head 4/18/2024   ACCESSION NUMBER(S): [id number]   ORDERING CLINICIAN: LESLY GASPAR   TECHNIQUE: Contiguous axial images of the head and neck were obtained after the intravenous administration of contrast. Coronal and sagittal reformatted images were obtained from the axial images. MIPS and 3D reformatted images were also performed and reviewed.   FINDINGS: The bilateral anterior cerebral arteries and middle cerebral arteries are grossly patent. There is segment of stenosis of P2 of the left posterior cerebral artery. The posterior arteries  are patent distally. The right posterior cerebral artery is patent.   There is stable large area of encephalomalacia in the right parietal, occipital and temporal lobe compatible with old infarct.   The right common carotid and internal carotid artery are grossly patent. There is atherosclerotic disease of the right carotid bulb and proximal term carotid artery without evidence of hemodynamically significant stenosis.   The left common carotid internal carotid are grossly patent. There is atherosclerotic disease of the left carotid bulb and proximal internal carotid artery without evidence of hemodynamically significant stenosis.   The bilateral vertebral arteries are grossly patent.       Patent bilateral anterior cerebral arteries and middle cerebral arteries.   Segment of stenosis of the P2 segment of left posterior cerebral artery. The left posterior cerebral artery is however patent distally. The right posterior cerebral artery is grossly patent.   Mild atherosclerotic disease of the bilateral carotid bulbs and proximal internal carotid arteries without evidence of hemodynamically significant stenosis.   Large area of encephalomalacia in the right parietal, occipital and temporal lobe compatible with old infarct.   If there is concern for superimposed acute ischemia, MRI may be obtained for further evaluation as clinically indicated.       MACRO: None   Signed by: Harshad Leon 4/18/2024 8:50 PM Dictation workstation:   [id number]    CT brain attack head wo IV contrast    Result Date: 4/18/2024  Interpreted By:  Charlie Rock, STUDY: CT BRAIN ATTACK HEAD WO IV CONTRAST;  4/18/2024 7:25 pm   INDICATION: Signs/Symptoms:Stroke Evaluation.   COMPARISON: 07/02/2019.   ACCESSION NUMBER(S): [id number]   ORDERING CLINICIAN: LESLY GASPAR   TECHNIQUE: Noncontrast axial CT scan of head was performed. Multiplanar reconstructions   FINDINGS: No acute intracranial hemorrhage, mass effect, midline shift, or  herniation. Interval development of a large area of right frontotemporoparietal encephalomalacia. No evidence of hydrocephalus. Mild global cerebral atrophy with concordant prominence of the ventricles and sulci. Periventricular and subcortical deep white matter hypodensity suggestive of chronic microangiopathic change. The visualized paranasal sinuses and mastoid air cells are clear. No acute osseous abnormality of the calvarium. No destructive bone lesion.       No acute intracranial abnormality. Consider follow-up with MRI as warranted.   Large right hemispheric area of encephalomalacia has developed in the interval. This suggests old infarct.   Signed by: Charlie Rock 4/18/2024 7:45 PM Dictation workstation:   [id number]      NIH Stroke Scale  1A. Level of Consciousness: Alert, Keenly Responsive  1B. Ask Month and Age: Both Questions Right  1C. Blink Eyes & Squeeze Hands: Performs Both Tasks  2. Best Gaze: Partial Gaze Palsy  3. Visual: Partial Hemianopia  4. Facial Palsy: Normal Symmetrical Movements  5A. Motor - Left Arm: No Drift  5B. Motor - Right Arm: No Drift  6A. Motor - Left Leg: No Effort Against Gravity  6B. Motor - Right Leg: Drift  7. Limb Ataxia: Absent  8. Sensory Loss: Normal  9. Best Language: No Aphasia  10. Dysarthria: Normal  11. Extinction and Inattention: No Abnormality  NIH Stroke Scale: 6           [place] Coma Scale  Best Eye Response: Spontaneous  Best Verbal Response: Oriented  Best Motor Response: Follows commands  [place] Coma Scale Score: 15                 I have personally reviewed the following imaging results MR brain wo IV contrast    Result Date: 4/19/2024  Interpreted By:  Jalen Peterson and Ogievich Taessa STUDY: MR BRAIN WO IV CONTRAST;  4/19/2024 8:35 am   INDICATION: Signs/Symptoms:new seizure. Per EMR: History of prior CVAs. On 04/18/2024 she was found on the floor of her house by her daughter shaking on the floor especially on her left side. Had an episode of  incontinence. And was A&Ox1 at that time.   COMPARISON: CT head 04/18/2024 MRI brain 07/03/2019   ACCESSION NUMBER(S): [id number]   ORDERING CLINICIAN: DAPHNE NAVARRO   TECHNIQUE: Axial T2, FLAIR, DWI, gradient echo T2 and sagittal and coronal T1 weighted images of the brain were acquired. Examination is motion limited   FINDINGS: Limited evaluation coronal view due to artifact.   CSF Spaces: There is prominence of ventricles and sulci compatible with diffuse parenchymal volume loss. No abnormal extra-axial collection.   Parenchyma: Large right hemispheric area of encephalomalacia has developed in the interim when compared to prior MRI from 07/03/2019. There is no restricted diffusion to suggest acute infarction. Minimal nonspecific T2 hyperintense foci in the white matter, likely secondary to chronic small-vessel ischemic disease. No evidence of intracranial hemorrhage. There is no mass effect or midline shift.   Paranasal Sinuses and Mastoids: Unremarkable.       Encephalomalacia right hemisphere consistent with previous infarction   There is no evidence of acute hemorrhage, mass lesion or acute infarction.   I personally reviewed the images/study and I agree with the findings as stated by Janeth Reynoso DO, PGY-2. This study was interpreted at [place], [place].   MACRO: None   Signed by: Jalen Peterson 4/19/2024 9:50 AM Dictation workstation:   [id number]    CT brain attack angio head and neck W and WO IV contrast    Result Date: 4/18/2024  Interpreted By:  Harshad Leon, STUDY: CT BRAIN ATTACK ANGIO HEAD AND NECK W AND WO IV CONTRAST;  4/18/2024 7:42 pm   INDICATION: Signs/Symptoms:VAN positive, left weakness.   COMPARISON: CT head 4/18/2024   ACCESSION NUMBER(S): [id number]   ORDERING CLINICIAN: LESLY GASPAR   TECHNIQUE: Contiguous axial images of the head and neck were obtained after the intravenous administration of contrast. Coronal and sagittal  reformatted images were obtained from the axial images. MIPS and 3D reformatted images were also performed and reviewed.   FINDINGS: The bilateral anterior cerebral arteries and middle cerebral arteries are grossly patent. There is segment of stenosis of P2 of the left posterior cerebral artery. The posterior arteries are patent distally. The right posterior cerebral artery is patent.   There is stable large area of encephalomalacia in the right parietal, occipital and temporal lobe compatible with old infarct.   The right common carotid and internal carotid artery are grossly patent. There is atherosclerotic disease of the right carotid bulb and proximal term carotid artery without evidence of hemodynamically significant stenosis.   The left common carotid internal carotid are grossly patent. There is atherosclerotic disease of the left carotid bulb and proximal internal carotid artery without evidence of hemodynamically significant stenosis.   The bilateral vertebral arteries are grossly patent.       Patent bilateral anterior cerebral arteries and middle cerebral arteries.   Segment of stenosis of the P2 segment of left posterior cerebral artery. The left posterior cerebral artery is however patent distally. The right posterior cerebral artery is grossly patent.   Mild atherosclerotic disease of the bilateral carotid bulbs and proximal internal carotid arteries without evidence of hemodynamically significant stenosis.   Large area of encephalomalacia in the right parietal, occipital and temporal lobe compatible with old infarct.   If there is concern for superimposed acute ischemia, MRI may be obtained for further evaluation as clinically indicated.       MACRO: None   Signed by: Harshad Leon 4/18/2024 8:50 PM Dictation workstation:   [id number]    CT brain attack head wo IV contrast    Result Date: 4/18/2024  Interpreted By:  Charlie Rock, STUDY: CT BRAIN ATTACK HEAD WO IV CONTRAST;  4/18/2024 7:25 pm    INDICATION: Signs/Symptoms:Stroke Evaluation.   COMPARISON: 2019.   ACCESSION NUMBER(S): [id number]   ORDERING CLINICIAN: LESLY GASPAR   TECHNIQUE: Noncontrast axial CT scan of head was performed. Multiplanar reconstructions   FINDINGS: No acute intracranial hemorrhage, mass effect, midline shift, or herniation. Interval development of a large area of right frontotemporoparietal encephalomalacia. No evidence of hydrocephalus. Mild global cerebral atrophy with concordant prominence of the ventricles and sulci. Periventricular and subcortical deep white matter hypodensity suggestive of chronic microangiopathic change. The visualized paranasal sinuses and mastoid air cells are clear. No acute osseous abnormality of the calvarium. No destructive bone lesion.       No acute intracranial abnormality. Consider follow-up with MRI as warranted.   Large right hemispheric area of encephalomalacia has developed in the interval. This suggests old infarct.       .  NIH Stroke Scale   Level of consciousness: alert = 0.     Ask patient current month and age: answers both correctly = 0.     Ask patient to open and close eyes: obeys both correctly = 1  Best gaze: normal = 0.     Visual field testing: no visual field loss = 0.     Facial paresis: normal symmetric movement = 0.     Motor function left arm: 1    Motor function right arm: normal = 0.     Motor function left le   Motor function right leg: normal = 0.     Limb ataxia: 1   Sensory (use pinprick): normal = 0.     Best language: 1  Dysarthria: normal articulation = 0.     Extinction and inattention: normal = 0.     NIH Stroke Total Score: 5       Assessment/Plan   Principal Problem:    Stroke-like symptoms    Likely seizure from prior stroke.  Obtain MRI brain and EEG.  Continue Keppra 500 mg twice daily as maintenance dose.  Continue with seizure precautions.  If she is stable and no signs of any infection she likely can be discharged later today or tomorrow if  no new seizures and testing above is unremarkable.   Continue xarelto and statin medication.    Thank you for this consultation.  Please call neurologist on-call for any questions or concerns.       Juanis Clement MD

## 2024-04-19 NOTE — CARE PLAN
The patient's goals for the shift include rest and comfort    The clinical goals for the shift include comfort and support

## 2024-04-20 VITALS
WEIGHT: 168.65 LBS | HEIGHT: 66 IN | RESPIRATION RATE: 16 BRPM | TEMPERATURE: 96.8 F | OXYGEN SATURATION: 96 % | BODY MASS INDEX: 27.1 KG/M2 | SYSTOLIC BLOOD PRESSURE: 120 MMHG | DIASTOLIC BLOOD PRESSURE: 88 MMHG | HEART RATE: 62 BPM

## 2024-04-20 LAB
ANION GAP SERPL CALC-SCNC: 12 MMOL/L (ref 10–20)
APPEARANCE UR: ABNORMAL
BILIRUB UR STRIP.AUTO-MCNC: NEGATIVE MG/DL
BUN SERPL-MCNC: 19 MG/DL (ref 6–23)
CALCIUM SERPL-MCNC: 8.5 MG/DL (ref 8.6–10.3)
CHLORIDE SERPL-SCNC: 105 MMOL/L (ref 98–107)
CO2 SERPL-SCNC: 27 MMOL/L (ref 21–32)
COLOR UR: YELLOW
CREAT SERPL-MCNC: 1.04 MG/DL (ref 0.5–1.05)
EGFRCR SERPLBLD CKD-EPI 2021: 55 ML/MIN/1.73M*2
ERYTHROCYTE [DISTWIDTH] IN BLOOD BY AUTOMATED COUNT: 13.5 % (ref 11.5–14.5)
GLUCOSE BLD MANUAL STRIP-MCNC: 101 MG/DL (ref 74–99)
GLUCOSE SERPL-MCNC: 96 MG/DL (ref 74–99)
GLUCOSE UR STRIP.AUTO-MCNC: NEGATIVE MG/DL
HCT VFR BLD AUTO: 38.9 % (ref 36–46)
HGB BLD-MCNC: 12.5 G/DL (ref 12–16)
HOLD SPECIMEN: NORMAL
KETONES UR STRIP.AUTO-MCNC: NEGATIVE MG/DL
LEUKOCYTE ESTERASE UR QL STRIP.AUTO: NEGATIVE
MAGNESIUM SERPL-MCNC: 1.61 MG/DL (ref 1.6–2.4)
MCH RBC QN AUTO: 30.2 PG (ref 26–34)
MCHC RBC AUTO-ENTMCNC: 32.1 G/DL (ref 32–36)
MCV RBC AUTO: 94 FL (ref 80–100)
NITRITE UR QL STRIP.AUTO: NEGATIVE
NRBC BLD-RTO: 0 /100 WBCS (ref 0–0)
PH UR STRIP.AUTO: 6 [PH]
PLATELET # BLD AUTO: 111 X10*3/UL (ref 150–450)
POTASSIUM SERPL-SCNC: 4.6 MMOL/L (ref 3.5–5.3)
PROT UR STRIP.AUTO-MCNC: NEGATIVE MG/DL
RBC # BLD AUTO: 4.14 X10*6/UL (ref 4–5.2)
RBC # UR STRIP.AUTO: ABNORMAL /UL
RBC #/AREA URNS AUTO: NORMAL /HPF
SODIUM SERPL-SCNC: 139 MMOL/L (ref 136–145)
SP GR UR STRIP.AUTO: 1.02
SQUAMOUS #/AREA URNS AUTO: NORMAL /HPF
UROBILINOGEN UR STRIP.AUTO-MCNC: <2 MG/DL
WBC # BLD AUTO: 6.4 X10*3/UL (ref 4.4–11.3)
WBC #/AREA URNS AUTO: NORMAL /HPF

## 2024-04-20 PROCEDURE — 96366 THER/PROPH/DIAG IV INF ADDON: CPT

## 2024-04-20 PROCEDURE — 36415 COLL VENOUS BLD VENIPUNCTURE: CPT

## 2024-04-20 PROCEDURE — G0378 HOSPITAL OBSERVATION PER HR: HCPCS

## 2024-04-20 PROCEDURE — 2500000001 HC RX 250 WO HCPCS SELF ADMINISTERED DRUGS (ALT 637 FOR MEDICARE OP)

## 2024-04-20 PROCEDURE — 84132 ASSAY OF SERUM POTASSIUM: CPT

## 2024-04-20 PROCEDURE — 99239 HOSP IP/OBS DSCHRG MGMT >30: CPT

## 2024-04-20 PROCEDURE — 81003 URINALYSIS AUTO W/O SCOPE: CPT

## 2024-04-20 PROCEDURE — 82947 ASSAY GLUCOSE BLOOD QUANT: CPT | Mod: 59

## 2024-04-20 PROCEDURE — 85027 COMPLETE CBC AUTOMATED: CPT

## 2024-04-20 PROCEDURE — 99232 SBSQ HOSP IP/OBS MODERATE 35: CPT | Performed by: PSYCHIATRY & NEUROLOGY

## 2024-04-20 PROCEDURE — 2500000004 HC RX 250 GENERAL PHARMACY W/ HCPCS (ALT 636 FOR OP/ED)

## 2024-04-20 PROCEDURE — 96365 THER/PROPH/DIAG IV INF INIT: CPT

## 2024-04-20 PROCEDURE — 83735 ASSAY OF MAGNESIUM: CPT

## 2024-04-20 RX ORDER — ATORVASTATIN CALCIUM 40 MG/1
40 TABLET, FILM COATED ORAL NIGHTLY
Qty: 30 TABLET | Refills: 1 | Status: SHIPPED | OUTPATIENT
Start: 2024-04-20 | End: 2024-04-20

## 2024-04-20 RX ORDER — ATORVASTATIN CALCIUM 40 MG/1
40 TABLET, FILM COATED ORAL NIGHTLY
Qty: 60 TABLET | Refills: 0 | Status: SHIPPED | OUTPATIENT
Start: 2024-04-20 | End: 2024-06-19

## 2024-04-20 RX ORDER — LEVETIRACETAM 500 MG/1
500 TABLET ORAL 2 TIMES DAILY
Qty: 60 TABLET | Refills: 1 | Status: SHIPPED | OUTPATIENT
Start: 2024-04-20 | End: 2024-04-20

## 2024-04-20 RX ORDER — LEVETIRACETAM 500 MG/1
500 TABLET ORAL 2 TIMES DAILY
Qty: 60 TABLET | Refills: 1 | Status: SHIPPED | OUTPATIENT
Start: 2024-04-20 | End: 2024-05-20

## 2024-04-20 RX ORDER — MAGNESIUM SULFATE HEPTAHYDRATE 40 MG/ML
2 INJECTION, SOLUTION INTRAVENOUS ONCE
Status: COMPLETED | OUTPATIENT
Start: 2024-04-20 | End: 2024-04-20

## 2024-04-20 RX ADMIN — ACETAMINOPHEN 650 MG: 325 TABLET ORAL at 09:18

## 2024-04-20 RX ADMIN — MAGNESIUM SULFATE HEPTAHYDRATE 2 G: 40 INJECTION, SOLUTION INTRAVENOUS at 08:02

## 2024-04-20 RX ADMIN — LEVETIRACETAM 500 MG: 500 TABLET, FILM COATED ORAL at 09:03

## 2024-04-20 RX ADMIN — POLYETHYLENE GLYCOL 3350 17 G: 17 POWDER, FOR SOLUTION ORAL at 09:03

## 2024-04-20 RX ADMIN — NADOLOL 20 MG: 40 TABLET ORAL at 09:03

## 2024-04-20 ASSESSMENT — PAIN DESCRIPTION - LOCATION: LOCATION: SHOULDER

## 2024-04-20 ASSESSMENT — PAIN SCALES - GENERAL: PAINLEVEL_OUTOF10: 3

## 2024-04-20 NOTE — PROGRESS NOTES
Met with patients daughter at bedside to follow up on home health care preference. Patients daughter provided preference of [facility]. Referral sent to [facility].     Addendum 1436: Patient has written discharge order. Home care orders sent to [facility].

## 2024-04-20 NOTE — CARE PLAN
The patient's goals for the shift include rest and comfort    The clinical goals for the shift include patient will remain free from changes in neurological symptoms

## 2024-04-20 NOTE — CARE PLAN
The patient's goals for the shift include rest and comfort    The clinical goals for the shift include patient will remain free from falls throughout shift

## 2024-04-20 NOTE — DISCHARGE SUMMARY
Discharge Diagnosis  Likely seizure from prior stroke  Rule out CVA  Paroxysmal A-fib  History of multiple DVT/PE  Anxiety/depression  Cirrhosis    Issues Requiring Follow-Up  Follow-up with neurology for possible stroke like activity that occurred.    Discharge Meds     Your medication list        ASK your doctor about these medications        Instructions Last Dose Given Next Dose Due   nadolol 20 mg tablet  Commonly known as: Corgard                    Test Results Pending At Discharge  Pending Labs       Order Current Status    Extra Urine Gray Tube In process    Urinalysis with Reflex Culture and Microscopic In process            Hospital Course   Tamar Connelly is a 78 y.o. female extensive past medical history including prior CVA, cirrhosis of the liver, history of multiple DVTs PE and paroxysmal A. fib, previous alcohol misuse, presented to hospital with sudden onset left upper extremity weakness, tremors, shaking.  Patient's symptoms were initially concerning for CVA versus TIA versus seizure.  Neurology was consulted and patient underwent CT and MRI of the head.  Neither of which seem to suggest a new CVA.  Patient underwent EEG which showed right-sided structural changes consistent with prior CVA.  Neurology believes patient's episode may have been a seizure induced by her prior CVA.  Patient had elevated lipid panel and was started on atorvastatin.  Patient underwent echo which revealed EF of 50%.  Patiently medically stable for discharge on 4/20/24 with home health care and neurology follow-up as well as new medications including Keppra and atorvastatin.    Pertinent Physical Exam At Time of Discharge  Physical Exam  General: Pleasant and awake. No apparent distress.  HEENT:  Normocephalic, atraumatic, mucus membranes moist.    Neck:  Trachea midline.  No JVD.    Chest:  Clear to auscultation bilaterally. No wheezes, rales, or rhonchi.  CV: Regular rate.  Irregular rhythm..  Positive S1/S2.   Abdomen: Obese  abdomen.  Slight tenderness to palpation diffusely.  Soft.  Extremities:  No lower extremity edema or cyanosis.   Neurological: ANO x 2.  Cranial nerves intact, visual fields full, extraocular motions are full with no nystagmus, motor strength is 5 out of 5 in right and upper and 4 out of 5 and left upper and lower.  Sensation is intact throughout  Skin:  Warm and dry.     Outpatient Follow-Up  No future appointments.      Rafita Castillo MD

## 2024-04-20 NOTE — PROGRESS NOTES
"Tamar Connelly is a 78 y.o. female on day 0 of admission presenting with Stroke-like symptoms.      Subjective   Patient is doing well.  Daughter is at bedside.  She has had no events overnight.  Blood pressures mildly elevated this morning.  She is eating and drinking well with no complaints. EEG showed no seizures. MRI brain showed no new stroke.        Objective   Neurological Exam  General Exam:     Appearance:  no acute distress.  HEENT: normocephalic /atraumatic.  Cardiovascular/Lungs/Abdomen: No carotid bruits to auscultation bilaterally, heart is regular in rate and rhythm.  Extremities/Skin: no peripheral edema.     NEUROLOGICAL EXAMINATION:     Mental status:  alert and oriented to person, place and month.       Cranial nerves:    II/III: Visual fields are full. Pupils are 2 mm and reactive bilaterally.  III/IV/VI: Extraocular movements are full  V: Facial sensation is intact to light touch.  VII: Face is symmetric.  VIII: hearing is impaired bilaterally.  IX/X: Palate elevates symmetrically  XI: Sternocleidomastoid is MRC 5/5 to strength testing.  XII: Tongue is midline.     Motor exam: Strength is MRC 5/5 throughout R, LUE/LLE 4/5      Sensory exam: Sensation is intact to light touch throughout.     Reflexes: Reflexes are 2+ and symmetric. Bilateral plantar responses are flexor.     Coordination: intact on R. Impaired on L  Last Recorded Vitals  Blood pressure 120/88, pulse 62, temperature 36 °C (96.8 °F), temperature source Temporal, resp. rate 16, height 1.676 m (5' 6\"), weight 76.5 kg (168 lb 10.4 oz), SpO2 96%.    Appearance:  no acute distress, cooperative.  HEENT: normocephalic /atraumatic.  Cardiovascular/Lungs/Abdomen: No carotid bruits to auscultation bilaterally, heart is regular in rate and rhythm.  Extremities/Skin: no peripheral edema.    NEUROLOGICAL EXAMINATION:    Mental status:  alert and oriented to person, place, date and situation.     Cranial nerves:    II/III: Visual fields are full. " Pupils are 2 mm and reactive bilaterally.  III/IV/VI: Extraocular movements are full with no nystagmus.   V: Facial sensation is intact to light touch.  VII: Face is symmetric.  VIII: hearing is intact bilaterally.  IX/X: Palate elevates symmetrically to phonation.  XI: Sternocleidomastoid is MRC 5/5 to strength testing.  XII: Tongue is midline.    Motor exam: Strength is MRC 5/5 throughout. tone is normal.    Sensory exam: Sensation is intact to light touch throughout.    Reflexes: Reflexes are 2+ and symmetric. Bilateral plantar responses are flexor.    Coordination: intact    Gait exam: no ataxia.     Relevant Results  Scheduled medications  atorvastatin, 40 mg, oral, Nightly  levETIRAcetam, 500 mg, oral, BID  nadolol, 20 mg, oral, Daily  perflutren lipid microspheres, 0.5-10 mL of dilution, intravenous, Once in imaging  perflutren protein A microsphere, 0.5 mL, intravenous, Once in imaging  polyethylene glycol, 17 g, oral, Daily  rivaroxaban, 20 mg, oral, Daily with evening meal  sulfur hexafluoride microsphr, 2 mL, intravenous, Once in imaging      Continuous medications     PRN medications  PRN medications: acetaminophen **OR** acetaminophen **OR** acetaminophen, melatonin  Results for orders placed or performed during the hospital encounter of 04/18/24 (from the past 24 hour(s))   Transthoracic Echo (TTE) Complete   Result Value Ref Range    AV pk william 0.81 m/s    AV mn grad 2.0 mmHg    LVOT diam 2.00 cm    LV Biplane EF 49 %    Tricuspid annular plane systolic excursion 1.2 cm    RV free wall pk S' 6.53 cm/s    LVIDd 3.70 cm    RVSP 25.3 mmHg    Aortic Valve Area by Continuity of VTI 3.73 cm2    Aortic Valve Area by Continuity of Peak Velocity 4.18 cm2    AV pk grad 2.6 mmHg    LV A4C EF 50.0    Urinalysis with Reflex Culture and Microscopic   Result Value Ref Range    Color, Urine Yellow Straw, Yellow    Appearance, Urine Hazy (N) Clear    Specific Gravity, Urine 1.017 1.005 - 1.035    pH, Urine 6.0 5.0,  5.5, 6.0, 6.5, 7.0, 7.5, 8.0    Protein, Urine NEGATIVE NEGATIVE mg/dL    Glucose, Urine NEGATIVE NEGATIVE mg/dL    Blood, Urine MODERATE (2+) (A) NEGATIVE    Ketones, Urine NEGATIVE NEGATIVE mg/dL    Bilirubin, Urine NEGATIVE NEGATIVE    Urobilinogen, Urine <2.0 <2.0 mg/dL    Nitrite, Urine NEGATIVE NEGATIVE    Leukocyte Esterase, Urine NEGATIVE NEGATIVE   Urinalysis Microscopic   Result Value Ref Range    WBC, Urine 1-5 1-5, NONE /HPF    RBC, Urine 3-5 NONE, 1-2, 3-5 /HPF    Squamous Epithelial Cells, Urine 1-9 (SPARSE) Reference range not established. /HPF   Magnesium   Result Value Ref Range    Magnesium 1.61 1.60 - 2.40 mg/dL   Basic Metabolic Panel   Result Value Ref Range    Glucose 96 74 - 99 mg/dL    Sodium 139 136 - 145 mmol/L    Potassium 4.6 3.5 - 5.3 mmol/L    Chloride 105 98 - 107 mmol/L    Bicarbonate 27 21 - 32 mmol/L    Anion Gap 12 10 - 20 mmol/L    Urea Nitrogen 19 6 - 23 mg/dL    Creatinine 1.04 0.50 - 1.05 mg/dL    eGFR 55 (L) >60 mL/min/1.73m*2    Calcium 8.5 (L) 8.6 - 10.3 mg/dL   CBC   Result Value Ref Range    WBC 6.4 4.4 - 11.3 x10*3/uL    nRBC 0.0 0.0 - 0.0 /100 WBCs    RBC 4.14 4.00 - 5.20 x10*6/uL    Hemoglobin 12.5 12.0 - 16.0 g/dL    Hematocrit 38.9 36.0 - 46.0 %    MCV 94 80 - 100 fL    MCH 30.2 26.0 - 34.0 pg    MCHC 32.1 32.0 - 36.0 g/dL    RDW 13.5 11.5 - 14.5 %    Platelets 111 (L) 150 - 450 x10*3/uL   POCT GLUCOSE   Result Value Ref Range    POCT Glucose 101 (H) 74 - 99 mg/dL      Transthoracic Echo (TTE) Complete    Result Date: 4/19/2024    [facility], [address] [facility] [id number] [phone] and                                 Fax [phone] TRANSTHORACIC ECHOCARDIOGRAM REPORT  Patient Name:      GASPER Palacios Physician:    [id number] Tevin Dotson MD Study Date:        4/19/2024            Ordering Provider:    [id number] LESLY BROWN MRN/PID:           [id number]             Fellow:  Accession#:        [id number]         Nurse: Date of Birth/Age: 1945 / 78 years  Sonographer:          Hever Pastrana RDCS Gender:            F                    Additional Staff: Height:            167.00 cm            Admit Date:           4/18/2024 Weight:            76.00 kg             Admission Status:     Inpatient -                                                               Routine BSA / BMI:         1.85 m2 / 27.25      Encounter#:           [id number]                    kg/m2                                         Department Location:  [facility] Blood Pressure: 141 /93 mmHg Study Type:    TRANSTHORACIC ECHO (TTE) COMPLETE Diagnosis/ICD: Chronic diastolic (congestive) heart failure (CHF)-I50.32 Indication:    Congestive Heart Failure CPT Code:      Echo Complete w Full [id number] Patient History: Pertinent History: CHF. Study Detail: The following Echo studies were performed: 2D, M-Mode, Doppler and               color flow. Technically challenging study due to the patient's               lack of cooperation and poor acoustic windows.  PHYSICIAN INTERPRETATION: Left Ventricle: The left ventricular systolic function is low normal, with an estimated ejection fraction of 50%. The patient is in atrial fibrillation which may influence the estimate of left ventricular function and transvalvular flows. There are no regional wall motion abnormalities. The left ventricular cavity size is normal. Left ventricular diastolic filling was indeterminate. Left Atrium: The left atrium is normal in size. Right Ventricle: The right ventricle is normal in size. There is normal right ventricular global systolic function. Right Atrium: The right atrium is mildly dilated. Aortic Valve: The aortic valve is trileaflet. There is trace to mild aortic valve regurgitation. The peak instantaneous gradient of the aortic valve is 2.6 mmHg. The mean gradient  of the aortic valve is 2.0 mmHg. Mitral Valve: The mitral valve is normal in structure. There is mild to moderate mitral annular calcification. There is trace to mild mitral valve regurgitation. Tricuspid Valve: The tricuspid valve is structurally normal. There is trace to mild tricuspid regurgitation. Pulmonic Valve: The pulmonic valve is structurally normal. There is no indication of pulmonic valve regurgitation. Pericardium: There is no pericardial effusion noted. Aorta: The aortic root is normal.  CONCLUSIONS:  1. Left ventricular systolic function is low normal with a 50% estimated ejection fraction.  2. The patient is in atrial fibrillation which may influence the estimate of left ventricular function and transvalvular flows. QUANTITATIVE DATA SUMMARY: 2D MEASUREMENTS:                          Normal Ranges: IVSd:          1.30 cm   (0.6-1.1cm) LVPWd:         1.10 cm   (0.6-1.1cm) LVIDd:         3.70 cm   (3.9-5.9cm) LVIDs:         2.20 cm LV Mass Index: 79.6 g/m2 LV % FS        40.5 % AORTA MEASUREMENTS:                    Normal Ranges: Asc Ao, d: 3.60 cm (2.1-3.4cm) LV SYSTOLIC FUNCTION BY 2D PLANIMETRY (MOD):                     Normal Ranges: EF-A4C View: 50.0 % (>=55%) EF-A2C View: 48.4 % EF-Biplane:  48.9 % LV DIASTOLIC FUNCTION:                        Normal Ranges: MV lateral e' 0.12 m/s MV medial e'  0.09 m/s AORTIC VALVE:                                   Normal Ranges: AoV Vmax:                0.81 m/s (<=1.7m/s) AoV Peak P.6 mmHg (<20mmHg) AoV Mean P.0 mmHg (1.7-11.5mmHg) LVOT Max Santi:            1.08 m/s (<=1.1m/s) AoV VTI:                 16.10 cm (18-25cm) LVOT VTI:                19.10 cm LVOT Diameter:           2.00 cm  (1.8-2.4cm) AoV Area, VTI:           3.73 cm2 (2.5-5.5cm2) AoV Area,Vmax:           4.18 cm2 (2.5-4.5cm2) AoV Dimensionless Index: 1.19  RIGHT VENTRICLE: RV Basal 3.60 cm RV Mid   2.70 cm RV Major 5.9 cm TAPSE:   11.9 mm RV s'    0.07 m/s  TRICUSPID VALVE/RVSP:                             Normal Ranges: Peak TR Velocity: 2.36 m/s RV Syst Pressure: 25.3 mmHg (< 30mmHg) PULMONIC VALVE:                      Normal Ranges: PV Max Santi: 0.7 m/s  (0.6-0.9m/s) PV Max P.9 mmHg PV Mean P.0 mmHg PV VTI:     12.40 cm  [id number] Tevin Dotson MD Electronically signed on 2024 at 5:13:27 PM  ** Final **     EEG    IMPRESSION This awake and sleep EEG is indicative of a right hemispheric structural lesion. No definitive epileptiform abnormalities or seizures noted. A full report will be scanned into the patient's chart at a later time. This report has been interpreted and electronically signed by    MR brain wo IV contrast    Result Date: 2024  Interpreted By:  Jalen Peterson and Ogievich Taessa STUDY: MR BRAIN WO IV CONTRAST;  2024 8:35 am   INDICATION: Signs/Symptoms:new seizure. Per EMR: History of prior CVAs. On 2024 she was found on the floor of her house by her daughter shaking on the floor especially on her left side. Had an episode of incontinence. And was A&Ox1 at that time.   COMPARISON: CT head 2024 MRI brain 2019   ACCESSION NUMBER(S): [id number]   ORDERING CLINICIAN: DAPHNE NAVARRO   TECHNIQUE: Axial T2, FLAIR, DWI, gradient echo T2 and sagittal and coronal T1 weighted images of the brain were acquired. Examination is motion limited   FINDINGS: Limited evaluation coronal view due to artifact.   CSF Spaces: There is prominence of ventricles and sulci compatible with diffuse parenchymal volume loss. No abnormal extra-axial collection.   Parenchyma: Large right hemispheric area of encephalomalacia has developed in the interim when compared to prior MRI from 2019. There is no restricted diffusion to suggest acute infarction. Minimal nonspecific T2 hyperintense foci in the white matter, likely secondary to chronic small-vessel ischemic disease. No evidence of intracranial hemorrhage. There is no mass effect or midline  shift.   Paranasal Sinuses and Mastoids: Unremarkable.       Encephalomalacia right hemisphere consistent with previous infarction   There is no evidence of acute hemorrhage, mass lesion or acute infarction.   I personally reviewed the images/study and I agree with the findings as stated by Janeth Reynoso DO, PGY-2. This study was interpreted at [facility], [place], [place].   MACRO: None   Signed by: Jalen Peterson 4/19/2024 9:50 AM Dictation workstation:   [id number]    CT brain attack angio head and neck W and WO IV contrast    Result Date: 4/18/2024  Interpreted By:  Harshad Leon, STUDY: CT BRAIN ATTACK ANGIO HEAD AND NECK W AND WO IV CONTRAST;  4/18/2024 7:42 pm   INDICATION: Signs/Symptoms:VAN positive, left weakness.   COMPARISON: CT head 4/18/2024   ACCESSION NUMBER(S): [id number]   ORDERING CLINICIAN: LESLY GASPAR   TECHNIQUE: Contiguous axial images of the head and neck were obtained after the intravenous administration of contrast. Coronal and sagittal reformatted images were obtained from the axial images. MIPS and 3D reformatted images were also performed and reviewed.   FINDINGS: The bilateral anterior cerebral arteries and middle cerebral arteries are grossly patent. There is segment of stenosis of P2 of the left posterior cerebral artery. The posterior arteries are patent distally. The right posterior cerebral artery is patent.   There is stable large area of encephalomalacia in the right parietal, occipital and temporal lobe compatible with old infarct.   The right common carotid and internal carotid artery are grossly patent. There is atherosclerotic disease of the right carotid bulb and proximal term carotid artery without evidence of hemodynamically significant stenosis.   The left common carotid internal carotid are grossly patent. There is atherosclerotic disease of the left carotid bulb and proximal internal carotid artery without evidence of  hemodynamically significant stenosis.   The bilateral vertebral arteries are grossly patent.       Patent bilateral anterior cerebral arteries and middle cerebral arteries.   Segment of stenosis of the P2 segment of left posterior cerebral artery. The left posterior cerebral artery is however patent distally. The right posterior cerebral artery is grossly patent.   Mild atherosclerotic disease of the bilateral carotid bulbs and proximal internal carotid arteries without evidence of hemodynamically significant stenosis.   Large area of encephalomalacia in the right parietal, occipital and temporal lobe compatible with old infarct.   If there is concern for superimposed acute ischemia, MRI may be obtained for further evaluation as clinically indicated.       MACRO: None   Signed by: Harshad Leon 4/18/2024 8:50 PM Dictation workstation:   [id number]    CT brain attack head wo IV contrast    Result Date: 4/18/2024  Interpreted By:  Charlie Rock, STUDY: CT BRAIN ATTACK HEAD WO IV CONTRAST;  4/18/2024 7:25 pm   INDICATION: Signs/Symptoms:Stroke Evaluation.   COMPARISON: 07/02/2019.   ACCESSION NUMBER(S): [id number]   ORDERING CLINICIAN: LESLY GASPAR   TECHNIQUE: Noncontrast axial CT scan of head was performed. Multiplanar reconstructions   FINDINGS: No acute intracranial hemorrhage, mass effect, midline shift, or herniation. Interval development of a large area of right frontotemporoparietal encephalomalacia. No evidence of hydrocephalus. Mild global cerebral atrophy with concordant prominence of the ventricles and sulci. Periventricular and subcortical deep white matter hypodensity suggestive of chronic microangiopathic change. The visualized paranasal sinuses and mastoid air cells are clear. No acute osseous abnormality of the calvarium. No destructive bone lesion.       No acute intracranial abnormality. Consider follow-up with MRI as warranted.   Large right hemispheric area of encephalomalacia has  developed in the interval. This suggests old infarct.   Signed by: Charlie Rock 4/18/2024 7:45 PM Dictation workstation:   [id number]       Doing well.  No seizures.  Continue keppra 500mg BID.  Follow up with her neurologist Dr. Abbott .    Will sign off.   Please call neurology on-call for any questions or concerns.

## 2024-04-20 NOTE — DISCHARGE INSTRUCTIONS
You will be taking 2 new medications following discharge.  One medication is Keppra to prevent seizures from occurring.  The other medication is atorvastatin for elevated cholesterol.  You should continue your home Xarelto and nadolol.  Continue with your home aspirin as well.    No driving for at least 6 months or unless cleared sooner when seen by neurologist.